# Patient Record
Sex: MALE | Race: WHITE | NOT HISPANIC OR LATINO | URBAN - METROPOLITAN AREA
[De-identification: names, ages, dates, MRNs, and addresses within clinical notes are randomized per-mention and may not be internally consistent; named-entity substitution may affect disease eponyms.]

---

## 2017-09-08 ENCOUNTER — EMERGENCY (EMERGENCY)
Facility: HOSPITAL | Age: 32
LOS: 1 days | Discharge: HOME | End: 2017-09-08
Attending: EMERGENCY MEDICINE | Admitting: EMERGENCY MEDICINE
Payer: COMMERCIAL

## 2017-09-08 VITALS
DIASTOLIC BLOOD PRESSURE: 72 MMHG | TEMPERATURE: 98 F | OXYGEN SATURATION: 98 % | RESPIRATION RATE: 18 BRPM | HEART RATE: 62 BPM | SYSTOLIC BLOOD PRESSURE: 122 MMHG

## 2017-09-08 VITALS — WEIGHT: 220.02 LBS | HEIGHT: 68 IN

## 2017-09-08 DIAGNOSIS — Z98.89 OTHER SPECIFIED POSTPROCEDURAL STATES: Chronic | ICD-10-CM

## 2017-09-08 LAB
ALBUMIN SERPL ELPH-MCNC: 4.7 G/DL — SIGNIFICANT CHANGE UP (ref 3.3–5)
ALP SERPL-CCNC: 72 U/L — SIGNIFICANT CHANGE UP (ref 40–120)
ALT FLD-CCNC: 33 U/L RC — SIGNIFICANT CHANGE UP (ref 10–45)
ANION GAP SERPL CALC-SCNC: 11 MMOL/L — SIGNIFICANT CHANGE UP (ref 5–17)
APPEARANCE UR: CLEAR — SIGNIFICANT CHANGE UP
APTT BLD: 32.1 SEC — SIGNIFICANT CHANGE UP (ref 27.5–37.4)
AST SERPL-CCNC: 22 U/L — SIGNIFICANT CHANGE UP (ref 10–40)
BASE EXCESS BLDV CALC-SCNC: -0.1 MMOL/L — SIGNIFICANT CHANGE UP (ref -2–2)
BASE EXCESS BLDV CALC-SCNC: 0.6 MMOL/L — SIGNIFICANT CHANGE UP (ref -2–2)
BASOPHILS # BLD AUTO: 0.1 K/UL — SIGNIFICANT CHANGE UP (ref 0–0.2)
BASOPHILS NFR BLD AUTO: 0.7 % — SIGNIFICANT CHANGE UP (ref 0–2)
BILIRUB SERPL-MCNC: 0.4 MG/DL — SIGNIFICANT CHANGE UP (ref 0.2–1.2)
BILIRUB UR-MCNC: NEGATIVE — SIGNIFICANT CHANGE UP
BUN SERPL-MCNC: 15 MG/DL — SIGNIFICANT CHANGE UP (ref 7–23)
CA-I SERPL-SCNC: 1.21 MMOL/L — SIGNIFICANT CHANGE UP (ref 1.12–1.3)
CA-I SERPL-SCNC: 1.23 MMOL/L — SIGNIFICANT CHANGE UP (ref 1.12–1.3)
CALCIUM SERPL-MCNC: 9.6 MG/DL — SIGNIFICANT CHANGE UP (ref 8.4–10.5)
CHLORIDE BLDV-SCNC: 106 MMOL/L — SIGNIFICANT CHANGE UP (ref 96–108)
CHLORIDE BLDV-SCNC: 108 MMOL/L — SIGNIFICANT CHANGE UP (ref 96–108)
CHLORIDE SERPL-SCNC: 102 MMOL/L — SIGNIFICANT CHANGE UP (ref 96–108)
CO2 BLDV-SCNC: 25 MMOL/L — SIGNIFICANT CHANGE UP (ref 22–30)
CO2 BLDV-SCNC: 28 MMOL/L — SIGNIFICANT CHANGE UP (ref 22–30)
CO2 SERPL-SCNC: 26 MMOL/L — SIGNIFICANT CHANGE UP (ref 22–31)
COLOR SPEC: SIGNIFICANT CHANGE UP
CREAT SERPL-MCNC: 0.77 MG/DL — SIGNIFICANT CHANGE UP (ref 0.5–1.3)
DIFF PNL FLD: NEGATIVE — SIGNIFICANT CHANGE UP
EOSINOPHIL # BLD AUTO: 0.2 K/UL — SIGNIFICANT CHANGE UP (ref 0–0.5)
EOSINOPHIL NFR BLD AUTO: 2 % — SIGNIFICANT CHANGE UP (ref 0–6)
EPI CELLS # UR: SIGNIFICANT CHANGE UP /HPF
GAS PNL BLDV: 135 MMOL/L — LOW (ref 136–145)
GAS PNL BLDV: 138 MMOL/L — SIGNIFICANT CHANGE UP (ref 136–145)
GAS PNL BLDV: SIGNIFICANT CHANGE UP
GLUCOSE BLDV-MCNC: 78 MG/DL — SIGNIFICANT CHANGE UP (ref 70–99)
GLUCOSE BLDV-MCNC: 79 MG/DL — SIGNIFICANT CHANGE UP (ref 70–99)
GLUCOSE SERPL-MCNC: 79 MG/DL — SIGNIFICANT CHANGE UP (ref 70–99)
GLUCOSE UR QL: NEGATIVE — SIGNIFICANT CHANGE UP
HCO3 BLDV-SCNC: 24 MMOL/L — SIGNIFICANT CHANGE UP (ref 21–29)
HCO3 BLDV-SCNC: 27 MMOL/L — SIGNIFICANT CHANGE UP (ref 21–29)
HCT VFR BLD CALC: 46.1 % — SIGNIFICANT CHANGE UP (ref 39–50)
HCT VFR BLDA CALC: 46 % — SIGNIFICANT CHANGE UP (ref 39–50)
HCT VFR BLDA CALC: 47 % — SIGNIFICANT CHANGE UP (ref 39–50)
HGB BLD CALC-MCNC: 15 G/DL — SIGNIFICANT CHANGE UP (ref 13–17)
HGB BLD CALC-MCNC: 15.4 G/DL — SIGNIFICANT CHANGE UP (ref 13–17)
HGB BLD-MCNC: 15.5 G/DL — SIGNIFICANT CHANGE UP (ref 13–17)
INR BLD: 0.98 RATIO — SIGNIFICANT CHANGE UP (ref 0.88–1.16)
KETONES UR-MCNC: NEGATIVE — SIGNIFICANT CHANGE UP
LACTATE BLDV-MCNC: 1 MMOL/L — SIGNIFICANT CHANGE UP (ref 0.7–2)
LACTATE BLDV-MCNC: 2.3 MMOL/L — HIGH (ref 0.7–2)
LEUKOCYTE ESTERASE UR-ACNC: NEGATIVE — SIGNIFICANT CHANGE UP
LIDOCAIN IGE QN: 31 U/L — SIGNIFICANT CHANGE UP (ref 7–60)
LYMPHOCYTES # BLD AUTO: 2.4 K/UL — SIGNIFICANT CHANGE UP (ref 1–3.3)
LYMPHOCYTES # BLD AUTO: 22.8 % — SIGNIFICANT CHANGE UP (ref 13–44)
MCHC RBC-ENTMCNC: 30.6 PG — SIGNIFICANT CHANGE UP (ref 27–34)
MCHC RBC-ENTMCNC: 33.5 GM/DL — SIGNIFICANT CHANGE UP (ref 32–36)
MCV RBC AUTO: 91.1 FL — SIGNIFICANT CHANGE UP (ref 80–100)
MONOCYTES # BLD AUTO: 0.7 K/UL — SIGNIFICANT CHANGE UP (ref 0–0.9)
MONOCYTES NFR BLD AUTO: 7 % — SIGNIFICANT CHANGE UP (ref 2–14)
NEUTROPHILS # BLD AUTO: 7.2 K/UL — SIGNIFICANT CHANGE UP (ref 1.8–7.4)
NEUTROPHILS NFR BLD AUTO: 67.5 % — SIGNIFICANT CHANGE UP (ref 43–77)
NITRITE UR-MCNC: NEGATIVE — SIGNIFICANT CHANGE UP
PCO2 BLDV: 40 MMHG — SIGNIFICANT CHANGE UP (ref 35–50)
PCO2 BLDV: 50 MMHG — SIGNIFICANT CHANGE UP (ref 35–50)
PH BLDV: 7.35 — SIGNIFICANT CHANGE UP (ref 7.35–7.45)
PH BLDV: 7.4 — SIGNIFICANT CHANGE UP (ref 7.35–7.45)
PH UR: 6 — SIGNIFICANT CHANGE UP (ref 5–8)
PLATELET # BLD AUTO: 222 K/UL — SIGNIFICANT CHANGE UP (ref 150–400)
PO2 BLDV: 37 MMHG — SIGNIFICANT CHANGE UP (ref 25–45)
PO2 BLDV: 53 MMHG — HIGH (ref 25–45)
POTASSIUM BLDV-SCNC: 3.5 MMOL/L — SIGNIFICANT CHANGE UP (ref 3.5–5)
POTASSIUM BLDV-SCNC: 4.8 MMOL/L — SIGNIFICANT CHANGE UP (ref 3.5–5)
POTASSIUM SERPL-MCNC: 4.8 MMOL/L — SIGNIFICANT CHANGE UP (ref 3.5–5.3)
POTASSIUM SERPL-SCNC: 4.8 MMOL/L — SIGNIFICANT CHANGE UP (ref 3.5–5.3)
PROT SERPL-MCNC: 7.4 G/DL — SIGNIFICANT CHANGE UP (ref 6–8.3)
PROT UR-MCNC: NEGATIVE — SIGNIFICANT CHANGE UP
PROTHROM AB SERPL-ACNC: 10.7 SEC — SIGNIFICANT CHANGE UP (ref 9.8–12.7)
RBC # BLD: 5.06 M/UL — SIGNIFICANT CHANGE UP (ref 4.2–5.8)
RBC # FLD: 11.9 % — SIGNIFICANT CHANGE UP (ref 10.3–14.5)
SAO2 % BLDV: 68 % — SIGNIFICANT CHANGE UP (ref 67–88)
SAO2 % BLDV: 88 % — SIGNIFICANT CHANGE UP (ref 67–88)
SODIUM SERPL-SCNC: 139 MMOL/L — SIGNIFICANT CHANGE UP (ref 135–145)
SP GR SPEC: 1.02 — SIGNIFICANT CHANGE UP (ref 1.01–1.02)
UROBILINOGEN FLD QL: NEGATIVE — SIGNIFICANT CHANGE UP
WBC # BLD: 10.6 K/UL — HIGH (ref 3.8–10.5)
WBC # FLD AUTO: 10.6 K/UL — HIGH (ref 3.8–10.5)
WBC UR QL: SIGNIFICANT CHANGE UP /HPF (ref 0–5)

## 2017-09-08 PROCEDURE — 96374 THER/PROPH/DIAG INJ IV PUSH: CPT | Mod: XU

## 2017-09-08 PROCEDURE — 85730 THROMBOPLASTIN TIME PARTIAL: CPT

## 2017-09-08 PROCEDURE — 93010 ELECTROCARDIOGRAM REPORT: CPT

## 2017-09-08 PROCEDURE — 74177 CT ABD & PELVIS W/CONTRAST: CPT

## 2017-09-08 PROCEDURE — 83690 ASSAY OF LIPASE: CPT

## 2017-09-08 PROCEDURE — 99285 EMERGENCY DEPT VISIT HI MDM: CPT | Mod: 25

## 2017-09-08 PROCEDURE — 83605 ASSAY OF LACTIC ACID: CPT

## 2017-09-08 PROCEDURE — 85027 COMPLETE CBC AUTOMATED: CPT

## 2017-09-08 PROCEDURE — 84132 ASSAY OF SERUM POTASSIUM: CPT

## 2017-09-08 PROCEDURE — 82947 ASSAY GLUCOSE BLOOD QUANT: CPT

## 2017-09-08 PROCEDURE — 80053 COMPREHEN METABOLIC PANEL: CPT

## 2017-09-08 PROCEDURE — 99284 EMERGENCY DEPT VISIT MOD MDM: CPT | Mod: 25

## 2017-09-08 PROCEDURE — 81001 URINALYSIS AUTO W/SCOPE: CPT

## 2017-09-08 PROCEDURE — 87086 URINE CULTURE/COLONY COUNT: CPT

## 2017-09-08 PROCEDURE — 93005 ELECTROCARDIOGRAM TRACING: CPT

## 2017-09-08 PROCEDURE — 96375 TX/PRO/DX INJ NEW DRUG ADDON: CPT

## 2017-09-08 PROCEDURE — 84295 ASSAY OF SERUM SODIUM: CPT

## 2017-09-08 PROCEDURE — 82330 ASSAY OF CALCIUM: CPT

## 2017-09-08 PROCEDURE — 82435 ASSAY OF BLOOD CHLORIDE: CPT

## 2017-09-08 PROCEDURE — 85014 HEMATOCRIT: CPT

## 2017-09-08 PROCEDURE — 82803 BLOOD GASES ANY COMBINATION: CPT

## 2017-09-08 PROCEDURE — 85610 PROTHROMBIN TIME: CPT

## 2017-09-08 PROCEDURE — 74177 CT ABD & PELVIS W/CONTRAST: CPT | Mod: 26

## 2017-09-08 RX ORDER — OXYCODONE HYDROCHLORIDE 5 MG/1
1 TABLET ORAL
Qty: 6 | Refills: 0 | OUTPATIENT
Start: 2017-09-08 | End: 2017-09-10

## 2017-09-08 RX ORDER — MORPHINE SULFATE 50 MG/1
2 CAPSULE, EXTENDED RELEASE ORAL ONCE
Qty: 0 | Refills: 0 | Status: DISCONTINUED | OUTPATIENT
Start: 2017-09-08 | End: 2017-09-08

## 2017-09-08 RX ORDER — ONDANSETRON 8 MG/1
1 TABLET, FILM COATED ORAL
Qty: 9 | Refills: 0 | OUTPATIENT
Start: 2017-09-08 | End: 2017-09-11

## 2017-09-08 RX ORDER — SODIUM CHLORIDE 9 MG/ML
1000 INJECTION INTRAMUSCULAR; INTRAVENOUS; SUBCUTANEOUS ONCE
Qty: 0 | Refills: 0 | Status: COMPLETED | OUTPATIENT
Start: 2017-09-08 | End: 2017-09-08

## 2017-09-08 RX ORDER — ONDANSETRON 8 MG/1
4 TABLET, FILM COATED ORAL ONCE
Qty: 0 | Refills: 0 | Status: COMPLETED | OUTPATIENT
Start: 2017-09-08 | End: 2017-09-08

## 2017-09-08 RX ORDER — ACETAMINOPHEN 500 MG
1000 TABLET ORAL ONCE
Qty: 0 | Refills: 0 | Status: COMPLETED | OUTPATIENT
Start: 2017-09-08 | End: 2017-09-08

## 2017-09-08 RX ADMIN — ONDANSETRON 4 MILLIGRAM(S): 8 TABLET, FILM COATED ORAL at 15:21

## 2017-09-08 RX ADMIN — SODIUM CHLORIDE 1000 MILLILITER(S): 9 INJECTION INTRAMUSCULAR; INTRAVENOUS; SUBCUTANEOUS at 15:54

## 2017-09-08 RX ADMIN — MORPHINE SULFATE 2 MILLIGRAM(S): 50 CAPSULE, EXTENDED RELEASE ORAL at 15:22

## 2017-09-08 RX ADMIN — Medication 1000 MILLIGRAM(S): at 11:50

## 2017-09-08 RX ADMIN — Medication 400 MILLIGRAM(S): at 11:37

## 2017-09-08 RX ADMIN — MORPHINE SULFATE 2 MILLIGRAM(S): 50 CAPSULE, EXTENDED RELEASE ORAL at 15:54

## 2017-09-08 NOTE — ED PROVIDER NOTE - ATTENDING CONTRIBUTION TO CARE
Gonzalez:  I have independently evaluated the patient and have documented in the appropriate sections above.  I agree with the exam and plan as noted above.

## 2017-09-08 NOTE — ED PROVIDER NOTE - PLAN OF CARE
1) Take oxycodone as prescribed. Do not drive or operate heavy machinary while taking.   2) Take zofran as prescribed   3) Take Tylenol 325mg tablet, take 2 tablets every 6-8 hours as needed for pain   4) You were given a copy of your results, please show them to your doctor for review.   5) Follow up with your GI doctor in 1-2 days for reevaluation, call clinic at [phone] for an appointment   6) Return to the ED for worsening pain, nausea, vomiting, diarrhea, fever greater than 100.4, chest pain, shortness of breath, or if you have any other new, worsening, or concerning symptoms.

## 2017-09-08 NOTE — ED PROVIDER NOTE - PHYSICAL EXAMINATION
Roth:  General: No distress.  Mentation at baseline.   HEENT: WNL  Chest/Lungs: CTAB, No wheeze, No retractions, No increased work of breathing, Normal rate  Heart: S1S2 RRR, No M/R/G, Pules equal Bilaterally in upper and lower extremities distally  Abd: soft, tender to left side no flank tenderness, No guarding, No rebound.  No hernias, no palpable masses.  Extrem: FROM in all joints, no significant edema noted, No ulcers.  Cap refil < 2sec.  Skin: No rash noted, warm dry.  Neuro:  Grossly normal.  No difficulty ambulating. No focal deficits.  Psychiatric: No evidence of delusions. No SI/HI.

## 2017-09-08 NOTE — ED PROVIDER NOTE - CARE PLAN
Principal Discharge DX:	Abdominal pain  Instructions for follow-up, activity and diet:	1) Take oxycodone as prescribed. Do not drive or operate heavy machinary while taking.   2) Take zofran as prescribed   3) Take Tylenol 325mg tablet, take 2 tablets every 6-8 hours as needed for pain   4) You were given a copy of your results, please show them to your doctor for review.   5) Follow up with your GI doctor in 1-2 days for reevaluation, call clinic at [phone] for an appointment   6) Return to the ED for worsening pain, nausea, vomiting, diarrhea, fever greater than 100.4, chest pain, shortness of breath, or if you have any other new, worsening, or concerning symptoms.

## 2017-09-08 NOTE — ED ADULT NURSE NOTE - OBJECTIVE STATEMENT
32 YO male presents to ED via walk-in with c/o abdominal pain to Left side, non radiating, pain 8/10, and nausea since last night. A&Ox4, gross neuro intact, PERRLA. Lungs clear upon auscultation bilat. S1/S2 noted. Abdomen soft, nontender, not distended, +bs. + peripheral pulses. MD at bedside to assess. Comfort and safety maintained.

## 2017-09-08 NOTE — ED ADULT TRIAGE NOTE - CHIEF COMPLAINT QUOTE
abd pain in LUQ, pt has known hernia. pt states abd pain has been getting worse since yesterday, hernia is in LLQ.

## 2017-09-08 NOTE — ED PROVIDER NOTE - MEDICAL DECISION MAKING DETAILS
31 year old male no past medical history presents to the ED for abdominal.  left upper quadrant pain worsening over past 2 weeks. Eval for colitis. Differential also includes pancreatitis, gastroesophageal reflux disease, ulcer and reeval.

## 2017-09-08 NOTE — ED PROVIDER NOTE - OBJECTIVE STATEMENT
31 year old male no past medical history presents to the ED for abdominal. Reports abdominal pain for 2 weeks, worsening over past 2 days, stabbing, intermittent, relieved with laying on his side, no aggravating factors, took advil without relief.  Reports nausea without vomiting. No fevers but reports chills. No diarrhea but reports loose stools. No hematochezia or melena. No chest pain, shortness of breath. No sick contacts or recent travel.     PCP: Dr. Wise 31 year old male no past medical history presents to the ED for abdominal. Reports abdominal pain for 2 weeks, worsening over past 2 days, stabbing, intermittent, relieved with laying on his side, no aggravating factors, took advil without relief.  Reports nausea without vomiting. No fevers but reports chills. No diarrhea but reports loose stools. No hematochezia or melena. No chest pain, shortness of breath. No sick contacts or recent travel.     Roth:  Pain to left side of abdomen with chills/nausea.    PCP: Dr. Wise

## 2017-09-08 NOTE — ED PROVIDER NOTE - PROGRESS NOTE DETAILS
pain improved, nausea improved, abdomen ssnt on reexamination, discussed need to follow up with GI, strict return precautions. patient understands and verbalizes back instructions.

## 2017-09-09 LAB
CULTURE RESULTS: SIGNIFICANT CHANGE UP
SPECIMEN SOURCE: SIGNIFICANT CHANGE UP

## 2017-09-11 ENCOUNTER — APPOINTMENT (OUTPATIENT)
Dept: INTERNAL MEDICINE | Facility: CLINIC | Age: 32
End: 2017-09-11
Payer: COMMERCIAL

## 2017-09-11 VITALS
WEIGHT: 221 LBS | SYSTOLIC BLOOD PRESSURE: 130 MMHG | BODY MASS INDEX: 33.11 KG/M2 | HEART RATE: 70 BPM | OXYGEN SATURATION: 97 % | DIASTOLIC BLOOD PRESSURE: 88 MMHG

## 2017-09-11 DIAGNOSIS — K40.90 UNILATERAL INGUINAL HERNIA, W/OUT OBSTRUCTION OR GANGRENE, NOT SPECIFIED AS RECURRENT: ICD-10-CM

## 2017-09-11 DIAGNOSIS — R10.9 UNSPECIFIED ABDOMINAL PAIN: ICD-10-CM

## 2017-09-11 PROCEDURE — 99214 OFFICE O/P EST MOD 30 MIN: CPT

## 2017-09-12 ENCOUNTER — APPOINTMENT (OUTPATIENT)
Dept: INTERNAL MEDICINE | Facility: CLINIC | Age: 32
End: 2017-09-12

## 2017-09-25 ENCOUNTER — APPOINTMENT (OUTPATIENT)
Dept: SURGERY | Facility: CLINIC | Age: 32
End: 2017-09-25

## 2018-03-01 ENCOUNTER — EMERGENCY (EMERGENCY)
Facility: HOSPITAL | Age: 33
LOS: 1 days | Discharge: HOME | End: 2018-03-01
Attending: EMERGENCY MEDICINE
Payer: COMMERCIAL

## 2018-03-01 VITALS
RESPIRATION RATE: 16 BRPM | HEIGHT: 68 IN | WEIGHT: 220.02 LBS | OXYGEN SATURATION: 97 % | SYSTOLIC BLOOD PRESSURE: 131 MMHG | TEMPERATURE: 98 F | HEART RATE: 85 BPM | DIASTOLIC BLOOD PRESSURE: 81 MMHG

## 2018-03-01 VITALS
RESPIRATION RATE: 17 BRPM | OXYGEN SATURATION: 99 % | HEART RATE: 69 BPM | SYSTOLIC BLOOD PRESSURE: 127 MMHG | DIASTOLIC BLOOD PRESSURE: 68 MMHG

## 2018-03-01 DIAGNOSIS — Z98.89 OTHER SPECIFIED POSTPROCEDURAL STATES: Chronic | ICD-10-CM

## 2018-03-01 LAB
ALBUMIN SERPL ELPH-MCNC: 4.4 G/DL — SIGNIFICANT CHANGE UP (ref 3.3–5)
ALP SERPL-CCNC: 63 U/L — SIGNIFICANT CHANGE UP (ref 40–120)
ALT FLD-CCNC: 32 U/L RC — SIGNIFICANT CHANGE UP (ref 10–45)
ANION GAP SERPL CALC-SCNC: 14 MMOL/L — SIGNIFICANT CHANGE UP (ref 5–17)
AST SERPL-CCNC: 23 U/L — SIGNIFICANT CHANGE UP (ref 10–40)
BASE EXCESS BLDV CALC-SCNC: 0.7 MMOL/L — SIGNIFICANT CHANGE UP (ref -2–2)
BASOPHILS # BLD AUTO: 0 K/UL — SIGNIFICANT CHANGE UP (ref 0–0.2)
BASOPHILS NFR BLD AUTO: 0.4 % — SIGNIFICANT CHANGE UP (ref 0–2)
BILIRUB SERPL-MCNC: 0.5 MG/DL — SIGNIFICANT CHANGE UP (ref 0.2–1.2)
BUN SERPL-MCNC: 8 MG/DL — SIGNIFICANT CHANGE UP (ref 7–23)
CA-I SERPL-SCNC: 1.21 MMOL/L — SIGNIFICANT CHANGE UP (ref 1.12–1.3)
CALCIUM SERPL-MCNC: 9.1 MG/DL — SIGNIFICANT CHANGE UP (ref 8.4–10.5)
CHLORIDE BLDV-SCNC: 105 MMOL/L — SIGNIFICANT CHANGE UP (ref 96–108)
CHLORIDE SERPL-SCNC: 102 MMOL/L — SIGNIFICANT CHANGE UP (ref 96–108)
CO2 BLDV-SCNC: 28 MMOL/L — SIGNIFICANT CHANGE UP (ref 22–30)
CO2 SERPL-SCNC: 23 MMOL/L — SIGNIFICANT CHANGE UP (ref 22–31)
CREAT SERPL-MCNC: 0.74 MG/DL — SIGNIFICANT CHANGE UP (ref 0.5–1.3)
EOSINOPHIL # BLD AUTO: 0.1 K/UL — SIGNIFICANT CHANGE UP (ref 0–0.5)
EOSINOPHIL NFR BLD AUTO: 1.1 % — SIGNIFICANT CHANGE UP (ref 0–6)
GAS PNL BLDV: 136 MMOL/L — SIGNIFICANT CHANGE UP (ref 136–145)
GAS PNL BLDV: SIGNIFICANT CHANGE UP
GAS PNL BLDV: SIGNIFICANT CHANGE UP
GLUCOSE BLDV-MCNC: 90 MG/DL — SIGNIFICANT CHANGE UP (ref 70–99)
GLUCOSE SERPL-MCNC: 90 MG/DL — SIGNIFICANT CHANGE UP (ref 70–99)
HCO3 BLDV-SCNC: 26 MMOL/L — SIGNIFICANT CHANGE UP (ref 21–29)
HCT VFR BLD CALC: 46.1 % — SIGNIFICANT CHANGE UP (ref 39–50)
HCT VFR BLDA CALC: 50 % — SIGNIFICANT CHANGE UP (ref 39–50)
HGB BLD CALC-MCNC: 16.4 G/DL — SIGNIFICANT CHANGE UP (ref 13–17)
HGB BLD-MCNC: 15.9 G/DL — SIGNIFICANT CHANGE UP (ref 13–17)
LACTATE BLDV-MCNC: 0.9 MMOL/L — SIGNIFICANT CHANGE UP (ref 0.7–2)
LIDOCAIN IGE QN: 19 U/L — SIGNIFICANT CHANGE UP (ref 7–60)
LYMPHOCYTES # BLD AUTO: 0.9 K/UL — LOW (ref 1–3.3)
LYMPHOCYTES # BLD AUTO: 12.1 % — LOW (ref 13–44)
MCHC RBC-ENTMCNC: 30.7 PG — SIGNIFICANT CHANGE UP (ref 27–34)
MCHC RBC-ENTMCNC: 34.5 GM/DL — SIGNIFICANT CHANGE UP (ref 32–36)
MCV RBC AUTO: 89 FL — SIGNIFICANT CHANGE UP (ref 80–100)
MONOCYTES # BLD AUTO: 0.8 K/UL — SIGNIFICANT CHANGE UP (ref 0–0.9)
MONOCYTES NFR BLD AUTO: 10.6 % — SIGNIFICANT CHANGE UP (ref 2–14)
NEUTROPHILS # BLD AUTO: 5.6 K/UL — SIGNIFICANT CHANGE UP (ref 1.8–7.4)
NEUTROPHILS NFR BLD AUTO: 75.8 % — SIGNIFICANT CHANGE UP (ref 43–77)
PCO2 BLDV: 48 MMHG — SIGNIFICANT CHANGE UP (ref 35–50)
PH BLDV: 7.36 — SIGNIFICANT CHANGE UP (ref 7.35–7.45)
PLATELET # BLD AUTO: 198 K/UL — SIGNIFICANT CHANGE UP (ref 150–400)
PO2 BLDV: 37 MMHG — SIGNIFICANT CHANGE UP (ref 25–45)
POTASSIUM BLDV-SCNC: 3.7 MMOL/L — SIGNIFICANT CHANGE UP (ref 3.5–5)
POTASSIUM SERPL-MCNC: 4 MMOL/L — SIGNIFICANT CHANGE UP (ref 3.5–5.3)
POTASSIUM SERPL-SCNC: 4 MMOL/L — SIGNIFICANT CHANGE UP (ref 3.5–5.3)
PROT SERPL-MCNC: 7.8 G/DL — SIGNIFICANT CHANGE UP (ref 6–8.3)
RBC # BLD: 5.17 M/UL — SIGNIFICANT CHANGE UP (ref 4.2–5.8)
RBC # FLD: 11.9 % — SIGNIFICANT CHANGE UP (ref 10.3–14.5)
SAO2 % BLDV: 67 % — SIGNIFICANT CHANGE UP (ref 67–88)
SODIUM SERPL-SCNC: 139 MMOL/L — SIGNIFICANT CHANGE UP (ref 135–145)
WBC # BLD: 7.4 K/UL — SIGNIFICANT CHANGE UP (ref 3.8–10.5)
WBC # FLD AUTO: 7.4 K/UL — SIGNIFICANT CHANGE UP (ref 3.8–10.5)

## 2018-03-01 PROCEDURE — 85014 HEMATOCRIT: CPT

## 2018-03-01 PROCEDURE — 99284 EMERGENCY DEPT VISIT MOD MDM: CPT

## 2018-03-01 PROCEDURE — 82435 ASSAY OF BLOOD CHLORIDE: CPT

## 2018-03-01 PROCEDURE — 84295 ASSAY OF SERUM SODIUM: CPT

## 2018-03-01 PROCEDURE — 82803 BLOOD GASES ANY COMBINATION: CPT

## 2018-03-01 PROCEDURE — 96374 THER/PROPH/DIAG INJ IV PUSH: CPT

## 2018-03-01 PROCEDURE — 85027 COMPLETE CBC AUTOMATED: CPT

## 2018-03-01 PROCEDURE — 82330 ASSAY OF CALCIUM: CPT

## 2018-03-01 PROCEDURE — 80053 COMPREHEN METABOLIC PANEL: CPT

## 2018-03-01 PROCEDURE — 82947 ASSAY GLUCOSE BLOOD QUANT: CPT

## 2018-03-01 PROCEDURE — 99284 EMERGENCY DEPT VISIT MOD MDM: CPT | Mod: 25

## 2018-03-01 PROCEDURE — 83690 ASSAY OF LIPASE: CPT

## 2018-03-01 PROCEDURE — 83605 ASSAY OF LACTIC ACID: CPT

## 2018-03-01 PROCEDURE — 96375 TX/PRO/DX INJ NEW DRUG ADDON: CPT

## 2018-03-01 PROCEDURE — 84132 ASSAY OF SERUM POTASSIUM: CPT

## 2018-03-01 RX ORDER — ONDANSETRON 8 MG/1
4 TABLET, FILM COATED ORAL ONCE
Qty: 0 | Refills: 0 | Status: COMPLETED | OUTPATIENT
Start: 2018-03-01 | End: 2018-03-01

## 2018-03-01 RX ORDER — ONDANSETRON 8 MG/1
1 TABLET, FILM COATED ORAL
Qty: 12 | Refills: 0 | OUTPATIENT
Start: 2018-03-01 | End: 2018-03-04

## 2018-03-01 RX ORDER — LIDOCAINE 4 G/100G
10 CREAM TOPICAL ONCE
Qty: 0 | Refills: 0 | Status: COMPLETED | OUTPATIENT
Start: 2018-03-01 | End: 2018-03-01

## 2018-03-01 RX ORDER — KETOROLAC TROMETHAMINE 30 MG/ML
15 SYRINGE (ML) INJECTION ONCE
Qty: 0 | Refills: 0 | Status: DISCONTINUED | OUTPATIENT
Start: 2018-03-01 | End: 2018-03-01

## 2018-03-01 RX ORDER — SODIUM CHLORIDE 9 MG/ML
1000 INJECTION INTRAMUSCULAR; INTRAVENOUS; SUBCUTANEOUS ONCE
Qty: 0 | Refills: 0 | Status: COMPLETED | OUTPATIENT
Start: 2018-03-01 | End: 2018-03-01

## 2018-03-01 RX ORDER — FAMOTIDINE 10 MG/ML
20 INJECTION INTRAVENOUS ONCE
Qty: 0 | Refills: 0 | Status: COMPLETED | OUTPATIENT
Start: 2018-03-01 | End: 2018-03-01

## 2018-03-01 RX ADMIN — Medication 30 MILLILITER(S): at 11:19

## 2018-03-01 RX ADMIN — Medication 15 MILLIGRAM(S): at 10:48

## 2018-03-01 RX ADMIN — FAMOTIDINE 20 MILLIGRAM(S): 10 INJECTION INTRAVENOUS at 11:19

## 2018-03-01 RX ADMIN — SODIUM CHLORIDE 4000 MILLILITER(S): 9 INJECTION INTRAMUSCULAR; INTRAVENOUS; SUBCUTANEOUS at 10:24

## 2018-03-01 RX ADMIN — ONDANSETRON 4 MILLIGRAM(S): 8 TABLET, FILM COATED ORAL at 10:24

## 2018-03-01 RX ADMIN — LIDOCAINE 10 MILLILITER(S): 4 CREAM TOPICAL at 11:19

## 2018-03-01 RX ADMIN — Medication 15 MILLIGRAM(S): at 10:24

## 2018-03-01 NOTE — ED ADULT NURSE REASSESSMENT NOTE - NS ED NURSE REASSESS COMMENT FT1
patient is resting in the romero. patient was able to tolerate po intake. denies any complaints. vss/nad.

## 2018-03-01 NOTE — ED PROVIDER NOTE - MEDICAL DECISION MAKING DETAILS
31yo M, nontoxic appearing, no signficant PMH, no risk factors for Cdiff, diarrhea does not sound as bacterial/invasive. does have h/o colitis but abd exam with only mild left upper quadrant pain and pain in past much more severe. likely viral gastroenteritis and possible food poisoning. will check labs/electrolytes. hydrate. pain control. GI cocktail. had discussion with patient regarding low suspicion for colitis especially without fevers and well appearing, will hold on CT.

## 2018-03-01 NOTE — ED PROVIDER NOTE - OBJECTIVE STATEMENT
33yo M with diarrhea/vomiting started Monday, ate at seafood restaurant Sunday night, no other sick contacts/recent travel or ABx use. vomiting stopped. +nausea. still with diarrhea- watery ~6-8x a day. nonbloody. ~24hrs of left sided abd pain, cramping. h/o colitis 1 yr ago, normal colonoscopy pain worse at that time. no fever. +chills.

## 2018-03-01 NOTE — ED PROVIDER NOTE - PHYSICAL EXAMINATION
Gen: NAD, AOx3  Head: NCAT  HEENT: PERRL, oral mucosa moist, normal conjunctiva, neck supple  Lung: CTAB, no respiratory distress  CV: rrr, no murmur, Normal perfusion  Abd: soft, NT, +ttp left upper quadrant mild no rebound/guarding, not peritoneal  MSK: No edema, no visible deformities  Neuro: No focal neurologic deficits  Skin: No rash   Psych: normal affect

## 2018-03-01 NOTE — ED PROVIDER NOTE - CARE PLAN
Principal Discharge DX:	Nausea and vomiting  Assessment and plan of treatment:	1. Return to ED for worsening, progressive or any other concerning symptoms   2. Follow up with your primary care doctor in 2-3days   3. Take motrin 600mg every 6 hours as needed for pain and Take Tylenol up to 650 mg every 6 hours as needed for pain.   4. Take 20mg of pepcid twice a day   5. You can also take over the counter maalox for upper abd pain and indigestion and imodium for diarrhea   6. Take 4mg Zofran every 6-8 hours as needed for nausea  Secondary Diagnosis:	Diarrhea

## 2018-03-01 NOTE — ED PROVIDER NOTE - ATTENDING CONTRIBUTION TO CARE
Patient is a 33 yo M with hx of IBS here for 5 days of nausea, vomiting, diarrhea and abdominal cramping. Symptoms began after eating out at a seafood restaurant. He states he had nbnb vomiting Monday, Tuesday and wednesday and has diarrhea daily for the past 5 days. Episodes are 6-8 times a day. No fevers. No travel. No family history of Crohn's disease/ Ulcerative colitis. Patient reports episode of colitis last year but pain was more severe at that time. Denies urinary symptoms.   VS noted  Gen. no acute distress, Non toxic   HEENT: EOMI, mmm  Lungs: CTAB/L no C/ W /R   CVS: RRR   Abd; Soft non tender, non distended, no CVA tenderness b/l  Ext: no edema  Skin: no rash  Neuro AAOx3 non focal clear speech  a/p: n/v/d - no hx of abdominal surgeries - exam benign - pt points to his LUQ for area of pain. Pain is non-radiating. Suspect gastroenteritis. Will treat symptomatically, check labs, IV hydrate and reassess.  - Kristina DUNCAN

## 2018-03-01 NOTE — ED PROVIDER NOTE - NS ED ROS FT
ROS: no CP/SOB. no cough. no fever. +n/v/d. +abd pain. no rash. no bleeding. no urinary complaints. no weakness. no vision changes. no HA. no neck/back pain. no extremity swelling/deformity. No change in mental status.

## 2018-03-01 NOTE — ED ADULT NURSE NOTE - OBJECTIVE STATEMENT
32 y m came to the ed c/o ruq abdominal pain. states having pain, nausea, vomiting and diarrhea since monday. states he ate at a seafood buffet and the symptoms began a few hours later. patient is a/ox3. denies any fevers, chest pain, sob. skin is warm and dry. denies any pain/burning on urination.

## 2018-03-01 NOTE — ED PROVIDER NOTE - PLAN OF CARE
1. Return to ED for worsening, progressive or any other concerning symptoms   2. Follow up with your primary care doctor in 2-3days   3. Take motrin 600mg every 6 hours as needed for pain and Take Tylenol up to 650 mg every 6 hours as needed for pain.   4. Take 20mg of pepcid twice a day   5. You can also take over the counter maalox for upper abd pain and indigestion and imodium for diarrhea   6. Take 4mg Zofran every 6-8 hours as needed for nausea

## 2018-03-01 NOTE — ED PROVIDER NOTE - PROGRESS NOTE DETAILS
abd pain improved, tolerating PO, labs unremarkable, will D/C likely viral gastroenteritis -Slowey DO

## 2019-05-19 ENCOUNTER — EMERGENCY (EMERGENCY)
Facility: HOSPITAL | Age: 34
LOS: 1 days | Discharge: HOME | End: 2019-05-19
Attending: EMERGENCY MEDICINE
Payer: COMMERCIAL

## 2019-05-19 VITALS
TEMPERATURE: 98 F | SYSTOLIC BLOOD PRESSURE: 114 MMHG | HEIGHT: 68 IN | RESPIRATION RATE: 18 BRPM | HEART RATE: 95 BPM | OXYGEN SATURATION: 97 % | DIASTOLIC BLOOD PRESSURE: 74 MMHG | WEIGHT: 220.02 LBS

## 2019-05-19 VITALS
OXYGEN SATURATION: 99 % | TEMPERATURE: 98 F | SYSTOLIC BLOOD PRESSURE: 125 MMHG | HEART RATE: 84 BPM | DIASTOLIC BLOOD PRESSURE: 79 MMHG | RESPIRATION RATE: 18 BRPM

## 2019-05-19 DIAGNOSIS — Z98.89 OTHER SPECIFIED POSTPROCEDURAL STATES: Chronic | ICD-10-CM

## 2019-05-19 DIAGNOSIS — Z98.890 OTHER SPECIFIED POSTPROCEDURAL STATES: Chronic | ICD-10-CM

## 2019-05-19 LAB
ALBUMIN SERPL ELPH-MCNC: 4.6 G/DL — SIGNIFICANT CHANGE UP (ref 3.3–5)
ALP SERPL-CCNC: 67 U/L — SIGNIFICANT CHANGE UP (ref 40–120)
ALT FLD-CCNC: 36 U/L — SIGNIFICANT CHANGE UP (ref 10–45)
ANION GAP SERPL CALC-SCNC: 12 MMOL/L — SIGNIFICANT CHANGE UP (ref 5–17)
AST SERPL-CCNC: 24 U/L — SIGNIFICANT CHANGE UP (ref 10–40)
BASE EXCESS BLDV CALC-SCNC: 3.3 MMOL/L — HIGH (ref -2–2)
BASOPHILS # BLD AUTO: 0.1 K/UL — SIGNIFICANT CHANGE UP (ref 0–0.2)
BASOPHILS NFR BLD AUTO: 0.4 % — SIGNIFICANT CHANGE UP (ref 0–2)
BILIRUB SERPL-MCNC: 0.7 MG/DL — SIGNIFICANT CHANGE UP (ref 0.2–1.2)
BUN SERPL-MCNC: 7 MG/DL — SIGNIFICANT CHANGE UP (ref 7–23)
CA-I SERPL-SCNC: 1.22 MMOL/L — SIGNIFICANT CHANGE UP (ref 1.12–1.3)
CALCIUM SERPL-MCNC: 9.7 MG/DL — SIGNIFICANT CHANGE UP (ref 8.4–10.5)
CHLORIDE BLDV-SCNC: 104 MMOL/L — SIGNIFICANT CHANGE UP (ref 96–108)
CHLORIDE SERPL-SCNC: 101 MMOL/L — SIGNIFICANT CHANGE UP (ref 96–108)
CO2 BLDV-SCNC: 30 MMOL/L — SIGNIFICANT CHANGE UP (ref 22–30)
CO2 SERPL-SCNC: 26 MMOL/L — SIGNIFICANT CHANGE UP (ref 22–31)
CREAT SERPL-MCNC: 0.65 MG/DL — SIGNIFICANT CHANGE UP (ref 0.5–1.3)
EOSINOPHIL # BLD AUTO: 0.2 K/UL — SIGNIFICANT CHANGE UP (ref 0–0.5)
EOSINOPHIL NFR BLD AUTO: 1.2 % — SIGNIFICANT CHANGE UP (ref 0–6)
GAS PNL BLDV: 138 MMOL/L — SIGNIFICANT CHANGE UP (ref 136–145)
GAS PNL BLDV: SIGNIFICANT CHANGE UP
GAS PNL BLDV: SIGNIFICANT CHANGE UP
GLUCOSE BLDV-MCNC: 80 MG/DL — SIGNIFICANT CHANGE UP (ref 70–99)
GLUCOSE SERPL-MCNC: 84 MG/DL — SIGNIFICANT CHANGE UP (ref 70–99)
HCO3 BLDV-SCNC: 28 MMOL/L — SIGNIFICANT CHANGE UP (ref 21–29)
HCT VFR BLD CALC: 45.1 % — SIGNIFICANT CHANGE UP (ref 39–50)
HCT VFR BLDA CALC: 46 % — SIGNIFICANT CHANGE UP (ref 39–50)
HGB BLD CALC-MCNC: 15.1 G/DL — SIGNIFICANT CHANGE UP (ref 13–17)
HGB BLD-MCNC: 14.4 G/DL — SIGNIFICANT CHANGE UP (ref 13–17)
LACTATE BLDV-MCNC: 1.1 MMOL/L — SIGNIFICANT CHANGE UP (ref 0.7–2)
LYMPHOCYTES # BLD AUTO: 11.8 % — LOW (ref 13–44)
LYMPHOCYTES # BLD AUTO: 2 K/UL — SIGNIFICANT CHANGE UP (ref 1–3.3)
MCHC RBC-ENTMCNC: 28.2 PG — SIGNIFICANT CHANGE UP (ref 27–34)
MCHC RBC-ENTMCNC: 31.9 GM/DL — LOW (ref 32–36)
MCV RBC AUTO: 88.5 FL — SIGNIFICANT CHANGE UP (ref 80–100)
MONOCYTES # BLD AUTO: 1.2 K/UL — HIGH (ref 0–0.9)
MONOCYTES NFR BLD AUTO: 7.4 % — SIGNIFICANT CHANGE UP (ref 2–14)
NEUTROPHILS # BLD AUTO: 13 K/UL — HIGH (ref 1.8–7.4)
NEUTROPHILS NFR BLD AUTO: 79.1 % — HIGH (ref 43–77)
PCO2 BLDV: 47 MMHG — SIGNIFICANT CHANGE UP (ref 35–50)
PH BLDV: 7.4 — SIGNIFICANT CHANGE UP (ref 7.35–7.45)
PLATELET # BLD AUTO: 215 K/UL — SIGNIFICANT CHANGE UP (ref 150–400)
PO2 BLDV: 31 MMHG — SIGNIFICANT CHANGE UP (ref 25–45)
POTASSIUM BLDV-SCNC: 3.5 MMOL/L — SIGNIFICANT CHANGE UP (ref 3.5–5.3)
POTASSIUM SERPL-MCNC: 3.6 MMOL/L — SIGNIFICANT CHANGE UP (ref 3.5–5.3)
POTASSIUM SERPL-SCNC: 3.6 MMOL/L — SIGNIFICANT CHANGE UP (ref 3.5–5.3)
PROT SERPL-MCNC: 7.3 G/DL — SIGNIFICANT CHANGE UP (ref 6–8.3)
RBC # BLD: 5.1 M/UL — SIGNIFICANT CHANGE UP (ref 4.2–5.8)
RBC # FLD: 11.7 % — SIGNIFICANT CHANGE UP (ref 10.3–14.5)
SAO2 % BLDV: 57 % — LOW (ref 67–88)
SODIUM SERPL-SCNC: 139 MMOL/L — SIGNIFICANT CHANGE UP (ref 135–145)
WBC # BLD: 16.5 K/UL — HIGH (ref 3.8–10.5)
WBC # FLD AUTO: 16.5 K/UL — HIGH (ref 3.8–10.5)

## 2019-05-19 PROCEDURE — 82330 ASSAY OF CALCIUM: CPT

## 2019-05-19 PROCEDURE — 82435 ASSAY OF BLOOD CHLORIDE: CPT

## 2019-05-19 PROCEDURE — 84295 ASSAY OF SERUM SODIUM: CPT

## 2019-05-19 PROCEDURE — 82803 BLOOD GASES ANY COMBINATION: CPT

## 2019-05-19 PROCEDURE — 99284 EMERGENCY DEPT VISIT MOD MDM: CPT | Mod: 25

## 2019-05-19 PROCEDURE — 96374 THER/PROPH/DIAG INJ IV PUSH: CPT | Mod: XU

## 2019-05-19 PROCEDURE — 71046 X-RAY EXAM CHEST 2 VIEWS: CPT

## 2019-05-19 PROCEDURE — 83605 ASSAY OF LACTIC ACID: CPT

## 2019-05-19 PROCEDURE — 71046 X-RAY EXAM CHEST 2 VIEWS: CPT | Mod: 26

## 2019-05-19 PROCEDURE — 82947 ASSAY GLUCOSE BLOOD QUANT: CPT

## 2019-05-19 PROCEDURE — 99284 EMERGENCY DEPT VISIT MOD MDM: CPT

## 2019-05-19 PROCEDURE — 74177 CT ABD & PELVIS W/CONTRAST: CPT

## 2019-05-19 PROCEDURE — 80053 COMPREHEN METABOLIC PANEL: CPT

## 2019-05-19 PROCEDURE — 84132 ASSAY OF SERUM POTASSIUM: CPT

## 2019-05-19 PROCEDURE — 74177 CT ABD & PELVIS W/CONTRAST: CPT | Mod: 26

## 2019-05-19 PROCEDURE — 85027 COMPLETE CBC AUTOMATED: CPT

## 2019-05-19 PROCEDURE — 85014 HEMATOCRIT: CPT

## 2019-05-19 RX ORDER — METOCLOPRAMIDE HCL 10 MG
10 TABLET ORAL ONCE
Refills: 0 | Status: COMPLETED | OUTPATIENT
Start: 2019-05-19 | End: 2019-05-19

## 2019-05-19 RX ORDER — SODIUM CHLORIDE 9 MG/ML
1000 INJECTION INTRAMUSCULAR; INTRAVENOUS; SUBCUTANEOUS ONCE
Refills: 0 | Status: COMPLETED | OUTPATIENT
Start: 2019-05-19 | End: 2019-05-19

## 2019-05-19 RX ADMIN — SODIUM CHLORIDE 1000 MILLILITER(S): 9 INJECTION INTRAMUSCULAR; INTRAVENOUS; SUBCUTANEOUS at 20:32

## 2019-05-19 RX ADMIN — Medication 10 MILLIGRAM(S): at 20:29

## 2019-05-19 NOTE — ED PROVIDER NOTE - FAMILY HISTORY
Father  Still living? Yes, Estimated age: Age Unknown  Family history of acute myocardial infarction, Age at diagnosis: Age Unknown

## 2019-05-19 NOTE — ED ADULT NURSE NOTE - OBJECTIVE STATEMENT
c/o about one week of body aches, tiredness, productive cough with white sputum and loss of appetite as well as discomfort in LLQ of abdomen at site of previous hernia repair with mesh. Patient has an upcming  C/o occasional nausea but denies vomiting or diarrhea. Tolerating po food and fluids at home but has decreased appetite. Denies any sick contact and denies any recent foreign travel. Breathing is unlabored. Abdomen is soft and nondistended, mildly tender in LLQ. Skin is warm dry and intact.

## 2019-05-19 NOTE — ED PROVIDER NOTE - CLINICAL SUMMARY MEDICAL DECISION MAKING FREE TEXT BOX
ATTG: : cough / fever / short of breath concern for pna / infection - will check labs, check xray chest, ivf, re eval for dispo

## 2019-05-19 NOTE — ED PROVIDER NOTE - PHYSICAL EXAMINATION
PHYSICAL EXAM:  General: No acute distress.  HEENT: NCAT.  PERRL.  EOMI.  No scleral icterus or injection.  Moist MM.   Neck: Supple.  Full ROM.  No JVD.  No thyromegaly.   Heart: RRR.  Normal S1 and S2.  No murmurs, rubs, or gallops.   Lungs: +RUL wheeze   Abdomen: BS+, soft, NT/ND.  No organomegaly. Mild tenderness in L groin. No masses/herniation appreciated  Skin: Warm and dry.  No rashes.  Extremities: No edema, clubbing, or cyanosis.    Neuro: A&Ox3.  CN II-XII intact.  5/5 strength in UE and LE b/l.  Tactile sensation intact in UE and LE b/l.

## 2019-05-19 NOTE — ED PROVIDER NOTE - ATTENDING CONTRIBUTION TO CARE
34 y/o m with pmhx denies, surg hx hernia repair at [facility] for inguinal hernia presents for concern of cough / fever / return of hernias and generalized fatigue. patient states approx 3 weeks ago started having some sensation on left side lower pelvis and was worried that his hernia came back. a week later started having cough and shortness of breath. now with dyspnea on exertion. tactile fever. no vomiting but having nausea. no diarrhea. no abd pain. no back pain. admits to having 10 lb unintentional weight loss.   no recent travel. denies any freq/ urg / dysuria. no penile discharge. no hx stis.  Gen.  no acute resp distress  HEENT:  perrl eomi   Lungs:  b/l bs  CVS: S1S2   Abd;  soft, no guarding. mild periumbilical discomfort.   Ext: no pitting edema or erythema   exam - no testicular masses or tenderness. no lesions. circumcised. no hernias palpated.   Neuro:aaox3 no focal deficits  MSK: strength 5/5 b/l upper and lower ext strength

## 2019-05-19 NOTE — ED PROVIDER NOTE - PROGRESS NOTE DETAILS
pt signed out ot me by agatha, pending cxr, plan for ct a/p if no sig findings. ct a/p non-actionable. pt re-evaluated, feels somewhat improved but continues to have myalgias. is most c/w viral illness. pt wishes to be d/c'd home. return precautions d/w patient, who expresses understanding. an opportunity to ask questions was provided and all answered. - Stan De Oliveira MD breanna pgy1: D/w pt likely viral etiology and results, tolerating PO, gave return precautions, will d/c

## 2019-05-19 NOTE — ED PROVIDER NOTE - NSFOLLOWUPINSTRUCTIONS_ED_ALL_ED_FT
(1) Follow up with your primary care physician as discussed  (2) Immediately seek care at your nearest emergency room if your worsen, persist, or do not resolve

## 2019-05-19 NOTE — ED PROVIDER NOTE - NS ED ROS FT
REVIEW OF SYSTEMS:  CONSTITUTIONAL: No weakness, fevers   EYES/ENT: No visual changes;  No vertigo or throat pain   NECK: No pain or stiffness  RESPIRATORY: +cough, wheezing, SOB  CARDIOVASCULAR: +SOB, No palpitations  GASTROINTESTINAL: +nausea, no vomiting;  No diarrhea or constipation. No hemetemesis, melena or hematochezia.  GENITOURINARY: No dysuria, frequency or hematuria  NEUROLOGICAL: No numbness or weakness  SKIN: No itching, burning, rashes, or lesions   All other review of systems is negative unless indicated above.

## 2019-05-19 NOTE — ED ADULT NURSE REASSESSMENT NOTE - NS ED NURSE REASSESS COMMENT FT1
Patient discharged from ED. IV removed, discharge paperwork provided by ED MD. Patient leaving unit ambulatory, free from harm.

## 2019-05-19 NOTE — ED PROVIDER NOTE - OBJECTIVE STATEMENT
Pt is a 33yM with a pmh of inguinal hernia repair b/l 2years ago, came in d/t 3-4 days of productive cough of white sputum. Pt states he started coughing white sputum, feeling body aches all over, and shortness of breath 3-4 days ago. Cough has been getting progressively worse, and nothing really makes it better or worse. Had associated chills, but no fevers. Reports mild sternal chest pain when he coughs. He denies any sick contacts. Pt does not use any inhaler or have any lung disease that he is aware of. Pt also reports 3wk hx of left groin pain. Pain doesn't radiate, not associated with urination. Denies dysuria. Pt denies penile discharge, testicular pain/swelling, has unprotected sex with just his wife. No hx of stds. Denies N/V/D, palpitations, weakness or numbness anywhere.

## 2019-05-19 NOTE — ED ADULT NURSE REASSESSMENT NOTE - NS ED NURSE REASSESS COMMENT FT1
Patient report received from Ginette ALY. Patient c/o generalized pain 7/10 to whole body worse with positioning. Patient is a/ox4, ambulatory without difficulty. Also c/o nausea. Patient medicated as ordered. Pending CT. VSS, afebrile Safety maintained.

## 2019-05-22 ENCOUNTER — APPOINTMENT (OUTPATIENT)
Dept: INTERNAL MEDICINE | Facility: CLINIC | Age: 34
End: 2019-05-22

## 2019-05-23 ENCOUNTER — APPOINTMENT (OUTPATIENT)
Dept: INTERNAL MEDICINE | Facility: CLINIC | Age: 34
End: 2019-05-23
Payer: COMMERCIAL

## 2019-05-23 VITALS
SYSTOLIC BLOOD PRESSURE: 140 MMHG | DIASTOLIC BLOOD PRESSURE: 80 MMHG | HEART RATE: 87 BPM | OXYGEN SATURATION: 98 % | HEIGHT: 68.5 IN | BODY MASS INDEX: 32.96 KG/M2 | WEIGHT: 220 LBS

## 2019-05-23 DIAGNOSIS — Q45.3 OTHER CONGENITAL MALFORMATIONS OF PANCREAS AND PANCREATIC DUCT: ICD-10-CM

## 2019-05-23 PROCEDURE — 99213 OFFICE O/P EST LOW 20 MIN: CPT

## 2019-05-23 NOTE — ASSESSMENT
[FreeTextEntry1] : advised pt he is overdue for his CPE and to schedule it with his PMD DR Laurent within 1 month

## 2019-05-23 NOTE — HISTORY OF PRESENT ILLNESS
[FreeTextEntry8] : Pt reports for the past 3 weeks feeling, generalized weakness and SOB with exertion such as with 1 flight of stairs\par -pt also has some wheezing\par -cough also for the past 2 weeks\par -currently no abdominal pain - he feels some discomfort LLQ but no pain\par -bilateral inguinal hernia repair was about 2 years ago - advised pt to f/u with his surgeon regarding the LLQ pain\par \par He had gone to the ED at [facility] on 5/19/19 for LLQ abdominal pain and cough deemed to be viral in etiology\par \par CBC- WBC 16.5k\par CMP- WNL\par \par CXR - unremarkable\par \par CT Abdomen/pelvis - mild fatty infiltration of the pancreatic head, unchanged and colonic diverticulosis\par -no acute findings\par \par

## 2019-05-23 NOTE — PHYSICAL EXAM
[No Acute Distress] : no acute distress [Normal Oropharynx] : the oropharynx was normal [Supple] : supple [No Lymphadenopathy] : no lymphadenopathy [No Respiratory Distress] : no respiratory distress  [Clear to Auscultation] : lungs were clear to auscultation bilaterally [No Accessory Muscle Use] : no accessory muscle use [Normal Rate] : normal rate  [Regular Rhythm] : with a regular rhythm [Normal S1, S2] : normal S1 and S2 [Soft] : abdomen soft [Non Tender] : non-tender [Normal Bowel Sounds] : normal bowel sounds [de-identified] : prolonged expirations bilaterally

## 2019-05-23 NOTE — REVIEW OF SYSTEMS
[Wheezing] : wheezing [Cough] : cough [Sore Throat] : no sore throat [Abdominal Pain] : no abdominal pain

## 2019-06-18 ENCOUNTER — APPOINTMENT (OUTPATIENT)
Dept: INTERNAL MEDICINE | Facility: CLINIC | Age: 34
End: 2019-06-18
Payer: COMMERCIAL

## 2019-06-18 VITALS
OXYGEN SATURATION: 98 % | RESPIRATION RATE: 14 BRPM | HEART RATE: 67 BPM | HEIGHT: 68 IN | BODY MASS INDEX: 33.34 KG/M2 | DIASTOLIC BLOOD PRESSURE: 50 MMHG | SYSTOLIC BLOOD PRESSURE: 90 MMHG | WEIGHT: 220 LBS

## 2019-06-18 DIAGNOSIS — J20.9 ACUTE BRONCHITIS, UNSPECIFIED: ICD-10-CM

## 2019-06-18 DIAGNOSIS — K57.32 DIVERTICULITIS OF LARGE INTESTINE W/OUT PERFORATION OR ABSCESS W/OUT BLEEDING: ICD-10-CM

## 2019-06-18 PROCEDURE — 99214 OFFICE O/P EST MOD 30 MIN: CPT

## 2019-06-19 PROBLEM — K57.32 DIVERTICULITIS, COLON: Status: ACTIVE | Noted: 2019-05-23

## 2019-06-19 NOTE — HISTORY OF PRESENT ILLNESS
[de-identified] : Here for f/u of asthmatic bronchitis.  Had upper airway mucus, feeling of being tight, sob with stairs in midst of malaise, exposure.  Had little congestion/heaviness on sides of nose recalling his URI history, but no sore throat, ear pain, h/a, fever, GI syx, myalgia.  Now all resolved.  Albuterol was helpful for his cough and robins.  \par \par In interim went to ER for L lower abd pain.  Points more to area a direct inguinal hernia would be.  Was sent to surg for opinion, who could  not document any hernia there.  Also had a CT for it in ER visit, told he had diverticula.  Saw Dr. Davila since, who discussed fiber, his being young for diverticular trouble and also seems to have given him a metronidazole/cipro/omeprazole combination (?h.pylori vs. treating diverticula and gastritis at the same time - asked for that office to send letter to clarify).   [FreeTextEntry1] : Here for f/u of an asthmatic bronchitis - see last note.

## 2019-06-19 NOTE — COUNSELING
[Weight management counseling provided] : Weight management [Healthy eating counseling provided] : healthy eating [Activity counseling provided] : activity [Good understanding] : Patient has a good understanding of lifestyle changes and the steps needed to achieve self management goals [Walking] : Walking [None] : None

## 2019-06-19 NOTE — ASSESSMENT
[FreeTextEntry1] : 1) asthmatic bronchitis resolved.  2) new problem - LLQ pain; feels like old hernia to pt, but can't document on exam here or with surgeon.  CT of abd for the pain w signs of diverticula, also had colonoscopy in past with Dr. Davila - told same.  Given diverticulitis regimen by Dr. Davila, with omeprazole.  To continue regimen for now.  Will get note from his office to clarify thoughts/plan.  3) cpe next, 3-4 mos.

## 2019-06-19 NOTE — PHYSICAL EXAM
[No Acute Distress] : no acute distress [Well Nourished] : well nourished [Well Developed] : well developed [No Respiratory Distress] : no respiratory distress  [Well-Appearing] : well-appearing [No Accessory Muscle Use] : no accessory muscle use [Normal Percussion] : the chest was normal to percussion [Clear to Auscultation] : lungs were clear to auscultation bilaterally [Regular Rhythm] : with a regular rhythm [Normal Rate] : normal rate  [Normal S1, S2] : normal S1 and S2 [No Murmur] : no murmur heard [No Carotid Bruits] : no carotid bruits [No Edema] : there was no peripheral edema [No Extremity Clubbing/Cyanosis] : no extremity clubbing/cyanosis [Soft] : abdomen soft [No HSM] : no HSM [Normal Bowel Sounds] : normal bowel sounds [Testes Mass (___cm)] : there were no testicular masses [Epididymis] : the epididymides were normal [Testes Tenderness] : no tenderness of the testes [de-identified] : vague discomfort w deep palpation LLQ; no hernia appreciated on valsalva across abdomen; no bulge in direct inguinal hernia site [de-identified] : no wheeze appreciated [FreeTextEntry1] : no epididymal bogginess or tenderness elicited; no SANDY, no indirect scrotal hernia appreciated

## 2019-06-19 NOTE — REVIEW OF SYSTEMS
[Fever] : no fever [Chills] : no chills [Night Sweats] : no night sweats [FreeTextEntry6] : see hpi [Negative] : Genitourinary [FreeTextEntry7] : see hpi

## 2019-08-30 ENCOUNTER — APPOINTMENT (OUTPATIENT)
Dept: INTERNAL MEDICINE | Facility: CLINIC | Age: 34
End: 2019-08-30

## 2019-09-20 ENCOUNTER — EMERGENCY (EMERGENCY)
Facility: HOSPITAL | Age: 34
LOS: 1 days | Discharge: HOME | End: 2019-09-20
Attending: EMERGENCY MEDICINE
Payer: COMMERCIAL

## 2019-09-20 VITALS
OXYGEN SATURATION: 99 % | RESPIRATION RATE: 16 BRPM | HEART RATE: 74 BPM | DIASTOLIC BLOOD PRESSURE: 74 MMHG | TEMPERATURE: 98 F | SYSTOLIC BLOOD PRESSURE: 108 MMHG

## 2019-09-20 VITALS
DIASTOLIC BLOOD PRESSURE: 73 MMHG | HEART RATE: 62 BPM | WEIGHT: 220.02 LBS | RESPIRATION RATE: 16 BRPM | TEMPERATURE: 98 F | OXYGEN SATURATION: 99 % | HEIGHT: 68 IN | SYSTOLIC BLOOD PRESSURE: 122 MMHG

## 2019-09-20 DIAGNOSIS — Z98.890 OTHER SPECIFIED POSTPROCEDURAL STATES: Chronic | ICD-10-CM

## 2019-09-20 DIAGNOSIS — Z98.89 OTHER SPECIFIED POSTPROCEDURAL STATES: Chronic | ICD-10-CM

## 2019-09-20 PROCEDURE — 99283 EMERGENCY DEPT VISIT LOW MDM: CPT

## 2019-09-20 PROCEDURE — 73130 X-RAY EXAM OF HAND: CPT

## 2019-09-20 PROCEDURE — 73130 X-RAY EXAM OF HAND: CPT | Mod: 26,RT

## 2019-09-20 RX ORDER — IBUPROFEN 200 MG
600 TABLET ORAL ONCE
Refills: 0 | Status: COMPLETED | OUTPATIENT
Start: 2019-09-20 | End: 2019-09-20

## 2019-09-20 RX ADMIN — Medication 600 MILLIGRAM(S): at 13:05

## 2019-09-20 RX ADMIN — Medication 600 MILLIGRAM(S): at 13:55

## 2019-09-20 NOTE — ED PROVIDER NOTE - CLINICAL SUMMARY MEDICAL DECISION MAKING FREE TEXT BOX
Felisha Vazquez): 32 y/o M with right hand injury 3 weeks ago, persistent pain with decreased ROM. Will give pain meds and will get x-ray.

## 2019-09-20 NOTE — ED ADULT NURSE NOTE - OBJECTIVE STATEMENT
32 y/o male with no significant pmhx biba from work with c/o allergic reaction.  per ems, pt has environmental allergies and began to experience itchy hives to face and hoarsed voice s/o rxn to perfume on the bus.  no other complaints noted.  no airway compromise.  no sob or respiratory distress appreciated.  pt is awake, alert and responsive to all stimuli.  pt recvd 25 mg benadryl via 20g R FA heplock.  no s/s of infiltration to insertion site.  erythema noted to face only.  safety precautions in place.  will continue to monitor. 32 y/o male 32 y/o Male seen in [place]/ Peds ER pt c/o rt hand pain s/p hand slammed in-between door and door-frame on 9/3.  Pt with right hand swelling.   No numbness/tingling.    Pt was seen by break coverage nurse Keenan Calixto.  Received report from nurse at 13:50pm.

## 2019-09-20 NOTE — ED PROVIDER NOTE - CARE PROVIDER_API CALL
Daren Duncan)  Orthopaedic Surgery  [address], [address]  [place] [address]  Phone: [phone]  Fax: [phone]  Follow Up Time:     Gabriel Teran (DO)  Plastic Surgery  [address] [place] [address]  Phone: [phone]  Fax: [phone]  Follow Up Time:

## 2019-09-20 NOTE — ED PROVIDER NOTE - NS_ ATTENDINGSCRIBEDETAILS _ED_A_ED_FT
Felisha Vazquez MD - Attending Physician: The scribe's documentation has been prepared under my direction and personally reviewed by me in its entirety. I confirm that the note above accurately reflects all work, treatment, procedures, and medical decision making performed by me.

## 2019-09-20 NOTE — ED PROVIDER NOTE - PATIENT PORTAL LINK FT
You can access the FollowMyHealth Patient Portal offered by [facility] by registering at the following website: [email or web address]. By joining [email or web address]’s FollowMyHealth portal, you will also be able to view your health information using other applications (apps) compatible with our system.

## 2019-09-20 NOTE — ED PROVIDER NOTE - MUSCULOSKELETAL MINIMAL EXAM
Tenderness and edema to the right hand overlying the distal fifth metacarpal. Decreased flexion and extension of fingers due to pain. No significant bruising. Healed punctate wound overlying site of pain. Distal sensation is intact. No sublux tenderness and no tenderness to the rest of his hand.

## 2019-09-20 NOTE — ED ADULT NURSE REASSESSMENT NOTE - NS ED NURSE REASSESS COMMENT FT1
Received report from break coverage nurse Katherine Man.  Pt with right hand swelling.  No acute respiratory distress noted, v/s obtained. Received report from [facility] coverage nurse Katherine Man at 13:50pm.  Pt with right  hand pain s/p hand slammed in-between door and door-frame on 9/3.  No numbness/tingling.  · Received report from break coverage nurse Katherine Man at 13:50pm.  Pt with right  hand pain s/p hand slammed in-between door and door-frame on 9/3.  Rt hand swelling.  No numbness/tingling.  · Pt discharged by Dr. Vazquez at 14:10pm.  · Pt discharged by Dr. Vazquez at 14:13pm.  ·

## 2019-09-20 NOTE — ED ADULT NURSE NOTE - NSIMPLEMENTINTERV_GEN_ALL_ED
Implemented All Universal Safety Interventions:  [place] to call system. Call bell, personal items and telephone within reach. Instruct patient to call for assistance. Room bathroom lighting operational. Non-slip footwear when patient is off stretcher. Physically safe environment: no spills, clutter or unnecessary equipment. Stretcher in lowest position, wheels locked, appropriate side rails in place.

## 2019-09-20 NOTE — ED PROVIDER NOTE - NSFOLLOWUPINSTRUCTIONS_ED_ALL_ED_FT
Thank you for visiting our Emergency Department, it has been a pleasure taking part in your healthcare.    Please follow up with your Primary Doctor as needed. Please follow-up with Hand/Orthopedics if you continue to have pain.        Hand Contusion  A hand contusion is a deep bruise to the hand. Contusions are the result of a blunt injury to tissues and muscle fibers under the skin. The injury causes bleeding under the skin. The skin overlying the contusion may turn blue, purple, or yellow. Minor injuries will give you a painless contusion, but more severe contusions may stay painful and swollen for a few weeks.    What are the causes?  A contusion is usually caused by a hard hit, trauma, or direct force to your hand, such as having a heavy object fall on your hand.    What are the signs or symptoms?  Symptoms of this condition include:  Swelling of the hand.  Pain and tenderness of the hand.  Discoloration of the hand. The area may have redness and then turn blue, purple, or yellow.  How is this diagnosed?  This condition is diagnosed from a physical exam and your medical history. An X-ray may be needed to see if there are any other injuries, such as broken bones (fractures). Sometimes, a CT scan or MRI may be needed if your health care provider is concerned that you may have torn or injured ligaments.    How is this treated?  An elastic wrap may be recommended to support your hand. In general, the best treatment for a hand contusion is rest, ice, pressure (compression), and elevation of the injured area. This is often called RICE therapy. Over-the-counter medicines may also be recommended for pain control.    Follow these instructions at home:  RICE Therapy     Rest the injured area.  If directed, apply ice to the injured area:  Put ice in a plastic bag.  Place a towel between your skin and the bag.  Leave the ice on for 20 minutes, 2–3 times a day.  If directed, apply light compression to the injured area using an elastic wrap. Make sure the wrap is not too tight. Remove and reapply the wrap as told by your health care provider. If your fingers become numb, cold, or blue, take the wrap off and reapply it more loosely.  Raise (elevate) the injured area above the level of your heart while you are sitting or lying down.  General instructions     Take over-the-counter and prescription medicines only as told by your health care provider.  Protect your hand from getting injured further.  Keep all follow-up visits as told by your health care provider. This is important.  Contact a health care provider if:  Your symptoms do not improve after several days of treatment.  You have increased redness, swelling, or pain in your hand or fingers.  You have difficulty moving the injured area.  Your swelling or pain is not relieved with medicines.  Get help right away if:  You have severe pain.  Your hand or fingers become numb.  Your hand or fingers turn pale, blue, or cold.  You cannot move your hand or wrist.  Your hand is warm to the touch.  This information is not intended to replace advice given to you by your health care provider. Make sure you discuss any questions you have with your health care provider.

## 2019-09-20 NOTE — ED PROVIDER NOTE - OBJECTIVE STATEMENT
32 y/o M with no significant pmhx and no significant pshx presents to the ED c/o r. hand pain and r. wrist pain s/p hand slammed in-between door and door-frame on 9/3. Pt has an appointment with  for this issue but could not wait until then because of the pain. Pt has taken Advil and Tylenol with no relief, has not taken any pain medication today. 32 y/o M with no significant pmhx and no significant pshx presents to the ED c/o r. hand pain s/p hand slammed in-between door and door-frame on 9/3. [facility] (was through worker's comp so was unable to be seen sooner) but could not wait until then because of the pain. Pt has taken Advil and Tylenol with no relief, has not taken any pain medication today. No numbness/tingling.

## 2020-12-14 ENCOUNTER — APPOINTMENT (OUTPATIENT)
Dept: INTERNAL MEDICINE | Facility: CLINIC | Age: 35
End: 2020-12-14
Payer: COMMERCIAL

## 2020-12-14 VITALS
DIASTOLIC BLOOD PRESSURE: 70 MMHG | SYSTOLIC BLOOD PRESSURE: 110 MMHG | OXYGEN SATURATION: 97 % | WEIGHT: 222 LBS | HEART RATE: 84 BPM | BODY MASS INDEX: 33.76 KG/M2

## 2020-12-14 PROCEDURE — 99072 ADDL SUPL MATRL&STAF TM PHE: CPT

## 2020-12-14 PROCEDURE — 99214 OFFICE O/P EST MOD 30 MIN: CPT

## 2020-12-14 RX ORDER — CIPROFLOXACIN HYDROCHLORIDE 500 MG/1
500 TABLET, FILM COATED ORAL
Qty: 20 | Refills: 0 | Status: COMPLETED | COMMUNITY
Start: 2020-12-14 | End: 2020-12-24

## 2020-12-14 RX ORDER — METRONIDAZOLE 500 MG/1
500 TABLET ORAL
Qty: 30 | Refills: 0 | Status: ACTIVE | COMMUNITY
Start: 2020-12-14 | End: 1900-01-01

## 2020-12-14 NOTE — PHYSICAL EXAM
[No Acute Distress] : no acute distress [Well Nourished] : well nourished [Well Developed] : well developed [Well-Appearing] : well-appearing [Soft] : abdomen soft [Rounded] : rounded [Normal] : normal to percussion [LLQ] : in the left lower quadrant [No Mass] : no masses were palpated [No HSM] : no hepatosplenomegaly noted [External Hemorrhoid] : an external hemorrhoid was present [Tender, Swollen] : that was nontender and non-swollen [Thrombosed] : that was not thrombosed

## 2020-12-14 NOTE — HISTORY OF PRESENT ILLNESS
[FreeTextEntry8] : C/O hemorrhoids from front to back.  Bled once - thought it was more pus.  This was last Wednesday.  Has tried preparation H without improvement.  Not constipated.  Pain is a 6/10, the other day it was very severe.  Denies fever and BM's have been normal.  No blood or mucus mixed in with the stool.\par \par Has a h/o ulcerative colitis but has not seen GI in a few years (constable).

## 2020-12-14 NOTE — ASSESSMENT
[FreeTextEntry1] : 35-year-old male with a history of colitis now with 1-1/2 weeks of abdominal and rectal pain with an episode of pussy discharge which she thought was from hemorrhoids.  I suspect he may have been an abscess given the presence of the leiva on a hemorrhoid but it was nontender at this time and likely drained.  With the left lower quadrant tenderness in conjunction with the rectal findings I suspect his colitis is active.  Will cover for any infectious causes with Cipro and Flagyl over the next 10 days but I advised him to make an appointment with his gastroenterologist as soon as possible.  If symptoms do not improve over the next 3 to 5 days would then also pursue a CT scan of the abdomen and pelvis.

## 2020-12-21 PROBLEM — J20.9 BRONCHITIS WITH BRONCHOSPASM: Status: RESOLVED | Noted: 2019-05-23 | Resolved: 2020-12-21

## 2021-04-28 ENCOUNTER — APPOINTMENT (OUTPATIENT)
Dept: INTERNAL MEDICINE | Facility: CLINIC | Age: 36
End: 2021-04-28
Payer: COMMERCIAL

## 2021-04-28 VITALS
SYSTOLIC BLOOD PRESSURE: 100 MMHG | BODY MASS INDEX: 29.25 KG/M2 | HEIGHT: 68 IN | OXYGEN SATURATION: 97 % | DIASTOLIC BLOOD PRESSURE: 60 MMHG | HEART RATE: 83 BPM | WEIGHT: 193 LBS

## 2021-04-28 DIAGNOSIS — K92.1 MELENA: ICD-10-CM

## 2021-04-28 DIAGNOSIS — Z86.39 PERSONAL HISTORY OF OTHER ENDOCRINE, NUTRITIONAL AND METABOLIC DISEASE: ICD-10-CM

## 2021-04-28 PROCEDURE — G0442 ANNUAL ALCOHOL SCREEN 15 MIN: CPT

## 2021-04-28 PROCEDURE — 99395 PREV VISIT EST AGE 18-39: CPT

## 2021-04-28 PROCEDURE — 99072 ADDL SUPL MATRL&STAF TM PHE: CPT

## 2021-05-03 RX ORDER — ALBUTEROL SULFATE 90 UG/1
108 (90 BASE) AEROSOL, METERED RESPIRATORY (INHALATION)
Qty: 1 | Refills: 0 | Status: COMPLETED | COMMUNITY
Start: 2019-05-23 | End: 2021-05-03

## 2021-05-03 RX ORDER — OMEPRAZOLE 20 MG/1
20 CAPSULE, DELAYED RELEASE ORAL DAILY
Qty: 30 | Refills: 3 | Status: COMPLETED | COMMUNITY
Start: 2017-09-11 | End: 2021-05-03

## 2021-05-03 NOTE — ASSESSMENT
[FreeTextEntry1] : 1) HCM - flu vacc encouraged annually, [place] w new child . TDAP - wants next visit . COVID vaccine discussed at length - he is considering/hesitant.  NICOLE, SB, SD, sunblock, exercise/wt loss strategy discussed.  Full labs for cpe ordered- will get CBC w bouts of hematochezia, bmp/lipids.  2) asked him to see GI again to discuss course - believe needs a/another full colonoscopy to be sure no IBD/ulcers lower colon, check on whether he has diverticula, etc.  He will call.  \par

## 2021-05-03 NOTE — HEALTH RISK ASSESSMENT
[Very Good] : ~his/her~  mood as very good [] : No [Yes] : Yes [2 - 4 times a month (2 pts)] : 2-4 times a month (2 points) [1 or 2 (0 pts)] : 1 or 2 (0 points) [No] : In the past 12 months have you used drugs other than those required for medical reasons? No [No falls in past year] : Patient reported no falls in the past year [0] : 2) Feeling down, depressed, or hopeless: Not at all (0) [Audit-CScore] : 2 [None] : None [With Family] : lives with family [Employed] : employed [College] : College [] :  [# Of Children ___] : has [unfilled] children [High Risk Behavior] : no high risk behavior [Feels Safe at Home] : Feels safe at home [Fully functional (bathing, dressing, toileting, transferring, walking, feeding)] : Fully functional (bathing, dressing, toileting, transferring, walking, feeding) [Fully functional (using the telephone, shopping, preparing meals, housekeeping, doing laundry, using] : Fully functional and needs no help or supervision to perform IADLs (using the telephone, shopping, preparing meals, housekeeping, doing laundry, using transportation, managing medications and managing finances) [Reports changes in hearing] : Reports no changes in hearing [Reports changes in vision] : Reports no changes in vision [Reports normal functional visual acuity (ie: able to read med bottle)] : Reports normal functional visual acuity [Reports changes in dental health] : Reports no changes in dental health [Smoke Detector] : smoke detector [Carbon Monoxide Detector] : carbon monoxide detector [Guns at Home] : no guns at home [Safety elements used in home] : safety elements used in home [Seat Belt] :  uses seat belt [Sunscreen] : uses sunscreen [Travel to Developing Areas] : does not  travel to developing areas [TB Exposure] : is not being exposed to tuberculosis [Caregiver Concerns] : does not have caregiver concerns

## 2021-05-03 NOTE — HISTORY OF PRESENT ILLNESS
[FreeTextEntry1] : Here for annual exam [de-identified] : Here for annual exam and to f/u on reflux, hx diverticulitis, ?hematochezia of unclear etiology, overweight.  Has lost excellent weight; feels good, maintaining it, he says.  Has a child now, and wants to be as healthy as possible.  Has had diverticulitis at a young age, and tends to have many episodes of BRB.  With his BRB has a pain/?cramp focally just L of mid lower abd - feels this same area every time he goes through some hematochezia.   Was felt in 2013 to have possible diverticulitis/colitis, but CT did not reveal clear etiology of an episode of mucus/blood/pain at that time.  Has no emesis/n/v/anorexia/fever-chills associated with episodes.  Has been seeing a GI doc outside of system.  Had what sounds like ?flex sig once.  \par \par Otherwise card/pulm/ rosyx is negative.

## 2021-05-03 NOTE — PHYSICAL EXAM
[No Acute Distress] : no acute distress [Well Nourished] : well nourished [Well Developed] : well developed [Well-Appearing] : well-appearing [Normal Sclera/Conjunctiva] : normal sclera/conjunctiva [PERRL] : pupils equal round and reactive to light [EOMI] : extraocular movements intact [Normal Outer Ear/Nose] : the outer ears and nose were normal in appearance [Normal Oropharynx] : the oropharynx was normal [Normal TMs] : both tympanic membranes were normal [Normal Nasal Mucosa] : the nasal mucosa was normal [No JVD] : no jugular venous distention [No Lymphadenopathy] : no lymphadenopathy [Supple] : supple [Thyroid Normal, No Nodules] : the thyroid was normal and there were no nodules present [No Respiratory Distress] : no respiratory distress  [No Accessory Muscle Use] : no accessory muscle use [Clear to Auscultation] : lungs were clear to auscultation bilaterally [Normal Percussion] : the chest was normal to percussion [Normal Rate] : normal rate  [Regular Rhythm] : with a regular rhythm [Normal S1, S2] : normal S1 and S2 [No Murmur] : no murmur heard [No Carotid Bruits] : no carotid bruits [No Abdominal Bruit] : a ~M bruit was not heard ~T in the abdomen [No Varicosities] : no varicosities [Pedal Pulses Present] : the pedal pulses are present [No Edema] : there was no peripheral edema [No Palpable Aorta] : no palpable aorta [No Extremity Clubbing/Cyanosis] : no extremity clubbing/cyanosis [Soft] : abdomen soft [Non-distended] : non-distended [No Masses] : no abdominal mass palpated [No HSM] : no HSM [Normal Bowel Sounds] : normal bowel sounds [Normal Supraclavicular Nodes] : no supraclavicular lymphadenopathy [Normal Posterior Cervical Nodes] : no posterior cervical lymphadenopathy [Normal Anterior Cervical Nodes] : no anterior cervical lymphadenopathy [No CVA Tenderness] : no CVA  tenderness [No Spinal Tenderness] : no spinal tenderness [No Joint Swelling] : no joint swelling [Grossly Normal Strength/Tone] : grossly normal strength/tone [No Rash] : no rash [No Skin Lesions] : no skin lesions [Coordination Grossly Intact] : coordination grossly intact [No Focal Deficits] : no focal deficits [Normal Gait] : normal gait [Deep Tendon Reflexes (DTR)] : deep tendon reflexes were 2+ and symmetric [Speech Grossly Normal] : speech grossly normal [Memory Grossly Normal] : memory grossly normal [Normal Affect] : the affect was normal [Normal Mood] : the mood was normal [Normal Insight/Judgement] : insight and judgment were intact [de-identified] : sl tenderness across lower abd, L > R  [de-identified] : no suspicious nevi

## 2021-07-20 LAB
ANION GAP SERPL CALC-SCNC: 13 MMOL/L
BASOPHILS # BLD AUTO: 0.07 K/UL
BASOPHILS NFR BLD AUTO: 0.7 %
BUN SERPL-MCNC: 10 MG/DL
CALCIUM SERPL-MCNC: 10.5 MG/DL
CHLORIDE SERPL-SCNC: 105 MMOL/L
CHOLEST SERPL-MCNC: 146 MG/DL
CO2 SERPL-SCNC: 24 MMOL/L
CREAT SERPL-MCNC: 0.86 MG/DL
EOSINOPHIL # BLD AUTO: 0.13 K/UL
EOSINOPHIL NFR BLD AUTO: 1.4 %
GLUCOSE SERPL-MCNC: 80 MG/DL
HCT VFR BLD CALC: 44.6 %
HDLC SERPL-MCNC: 43 MG/DL
HGB BLD-MCNC: 14.7 G/DL
IMM GRANULOCYTES NFR BLD AUTO: 0.3 %
LDLC SERPL CALC-MCNC: 83 MG/DL
LYMPHOCYTES # BLD AUTO: 1.54 K/UL
LYMPHOCYTES NFR BLD AUTO: 16.4 %
MAN DIFF?: NORMAL
MCHC RBC-ENTMCNC: 29.7 PG
MCHC RBC-ENTMCNC: 33 GM/DL
MCV RBC AUTO: 90.1 FL
MONOCYTES # BLD AUTO: 0.57 K/UL
MONOCYTES NFR BLD AUTO: 6.1 %
NEUTROPHILS # BLD AUTO: 7.07 K/UL
NEUTROPHILS NFR BLD AUTO: 75.1 %
NONHDLC SERPL-MCNC: 103 MG/DL
PLATELET # BLD AUTO: 201 K/UL
POTASSIUM SERPL-SCNC: 4.6 MMOL/L
RBC # BLD: 4.95 M/UL
RBC # FLD: 13.3 %
SODIUM SERPL-SCNC: 142 MMOL/L
TRIGL SERPL-MCNC: 99 MG/DL
WBC # FLD AUTO: 9.41 K/UL

## 2022-01-14 ENCOUNTER — EMERGENCY (EMERGENCY)
Facility: HOSPITAL | Age: 37
LOS: 1 days | Discharge: HOME | End: 2022-01-14
Attending: EMERGENCY MEDICINE
Payer: COMMERCIAL

## 2022-01-14 VITALS
DIASTOLIC BLOOD PRESSURE: 82 MMHG | SYSTOLIC BLOOD PRESSURE: 132 MMHG | RESPIRATION RATE: 20 BRPM | WEIGHT: 244.93 LBS | HEIGHT: 68 IN | OXYGEN SATURATION: 98 % | HEART RATE: 86 BPM

## 2022-01-14 DIAGNOSIS — Z98.890 OTHER SPECIFIED POSTPROCEDURAL STATES: Chronic | ICD-10-CM

## 2022-01-14 DIAGNOSIS — Z98.89 OTHER SPECIFIED POSTPROCEDURAL STATES: Chronic | ICD-10-CM

## 2022-01-14 LAB
ALBUMIN SERPL ELPH-MCNC: 4.7 G/DL — SIGNIFICANT CHANGE UP (ref 3.3–5)
ALP SERPL-CCNC: 76 U/L — SIGNIFICANT CHANGE UP (ref 40–120)
ALT FLD-CCNC: 40 U/L — SIGNIFICANT CHANGE UP (ref 10–45)
ANION GAP SERPL CALC-SCNC: 19 MMOL/L — HIGH (ref 5–17)
AST SERPL-CCNC: 36 U/L — SIGNIFICANT CHANGE UP (ref 10–40)
BASE EXCESS BLDV CALC-SCNC: -0.7 MMOL/L — SIGNIFICANT CHANGE UP (ref -2–2)
BASOPHILS # BLD AUTO: 0.08 K/UL — SIGNIFICANT CHANGE UP (ref 0–0.2)
BASOPHILS NFR BLD AUTO: 0.6 % — SIGNIFICANT CHANGE UP (ref 0–2)
BILIRUB SERPL-MCNC: 0.2 MG/DL — SIGNIFICANT CHANGE UP (ref 0.2–1.2)
BUN SERPL-MCNC: 13 MG/DL — SIGNIFICANT CHANGE UP (ref 7–23)
CA-I SERPL-SCNC: 1.16 MMOL/L — SIGNIFICANT CHANGE UP (ref 1.15–1.33)
CALCIUM SERPL-MCNC: 9.7 MG/DL — SIGNIFICANT CHANGE UP (ref 8.4–10.5)
CHLORIDE BLDV-SCNC: 103 MMOL/L — SIGNIFICANT CHANGE UP (ref 96–108)
CHLORIDE SERPL-SCNC: 102 MMOL/L — SIGNIFICANT CHANGE UP (ref 96–108)
CO2 BLDV-SCNC: 24 MMOL/L — SIGNIFICANT CHANGE UP (ref 22–26)
CO2 SERPL-SCNC: 19 MMOL/L — LOW (ref 22–31)
CREAT SERPL-MCNC: 0.66 MG/DL — SIGNIFICANT CHANGE UP (ref 0.5–1.3)
EOSINOPHIL # BLD AUTO: 0.13 K/UL — SIGNIFICANT CHANGE UP (ref 0–0.5)
EOSINOPHIL NFR BLD AUTO: 0.9 % — SIGNIFICANT CHANGE UP (ref 0–6)
GAS PNL BLDV: 137 MMOL/L — SIGNIFICANT CHANGE UP (ref 136–145)
GAS PNL BLDV: SIGNIFICANT CHANGE UP
GAS PNL BLDV: SIGNIFICANT CHANGE UP
GLUCOSE BLDV-MCNC: 139 MG/DL — HIGH (ref 70–99)
GLUCOSE SERPL-MCNC: 144 MG/DL — HIGH (ref 70–99)
HCO3 BLDV-SCNC: 23 MMOL/L — SIGNIFICANT CHANGE UP (ref 22–29)
HCT VFR BLD CALC: 40.5 % — SIGNIFICANT CHANGE UP (ref 39–50)
HCT VFR BLDA CALC: 43 % — SIGNIFICANT CHANGE UP (ref 39–51)
HGB BLD CALC-MCNC: 14.2 G/DL — SIGNIFICANT CHANGE UP (ref 12.6–17.4)
HGB BLD-MCNC: 13.9 G/DL — SIGNIFICANT CHANGE UP (ref 13–17)
IMM GRANULOCYTES NFR BLD AUTO: 0.9 % — SIGNIFICANT CHANGE UP (ref 0–1.5)
LACTATE BLDV-MCNC: 5.1 MMOL/L — CRITICAL HIGH (ref 0.7–2)
LYMPHOCYTES # BLD AUTO: 24.6 % — SIGNIFICANT CHANGE UP (ref 13–44)
LYMPHOCYTES # BLD AUTO: 3.45 K/UL — HIGH (ref 1–3.3)
MCHC RBC-ENTMCNC: 29.6 PG — SIGNIFICANT CHANGE UP (ref 27–34)
MCHC RBC-ENTMCNC: 34.3 GM/DL — SIGNIFICANT CHANGE UP (ref 32–36)
MCV RBC AUTO: 86.2 FL — SIGNIFICANT CHANGE UP (ref 80–100)
MONOCYTES # BLD AUTO: 0.79 K/UL — SIGNIFICANT CHANGE UP (ref 0–0.9)
MONOCYTES NFR BLD AUTO: 5.6 % — SIGNIFICANT CHANGE UP (ref 2–14)
NEUTROPHILS # BLD AUTO: 9.43 K/UL — HIGH (ref 1.8–7.4)
NEUTROPHILS NFR BLD AUTO: 67.4 % — SIGNIFICANT CHANGE UP (ref 43–77)
NRBC # BLD: 0 /100 WBCS — SIGNIFICANT CHANGE UP (ref 0–0)
PCO2 BLDV: 36 MMHG — LOW (ref 42–55)
PH BLDV: 7.42 — SIGNIFICANT CHANGE UP (ref 7.32–7.43)
PLATELET # BLD AUTO: 256 K/UL — SIGNIFICANT CHANGE UP (ref 150–400)
PO2 BLDV: 53 MMHG — HIGH (ref 25–45)
POTASSIUM BLDV-SCNC: 3.8 MMOL/L — SIGNIFICANT CHANGE UP (ref 3.5–5.1)
POTASSIUM SERPL-MCNC: 3.6 MMOL/L — SIGNIFICANT CHANGE UP (ref 3.5–5.3)
POTASSIUM SERPL-SCNC: 3.6 MMOL/L — SIGNIFICANT CHANGE UP (ref 3.5–5.3)
PROT SERPL-MCNC: 7.2 G/DL — SIGNIFICANT CHANGE UP (ref 6–8.3)
RBC # BLD: 4.7 M/UL — SIGNIFICANT CHANGE UP (ref 4.2–5.8)
RBC # FLD: 13.1 % — SIGNIFICANT CHANGE UP (ref 10.3–14.5)
SAO2 % BLDV: 85.7 % — SIGNIFICANT CHANGE UP (ref 67–88)
SODIUM SERPL-SCNC: 140 MMOL/L — SIGNIFICANT CHANGE UP (ref 135–145)
TROPONIN T, HIGH SENSITIVITY RESULT: <6 NG/L — SIGNIFICANT CHANGE UP (ref 0–51)
WBC # BLD: 14 K/UL — HIGH (ref 3.8–10.5)
WBC # FLD AUTO: 14 K/UL — HIGH (ref 3.8–10.5)

## 2022-01-14 PROCEDURE — 36415 COLL VENOUS BLD VENIPUNCTURE: CPT

## 2022-01-14 PROCEDURE — 82435 ASSAY OF BLOOD CHLORIDE: CPT

## 2022-01-14 PROCEDURE — 80053 COMPREHEN METABOLIC PANEL: CPT

## 2022-01-14 PROCEDURE — 85025 COMPLETE CBC W/AUTO DIFF WBC: CPT

## 2022-01-14 PROCEDURE — 85379 FIBRIN DEGRADATION QUANT: CPT

## 2022-01-14 PROCEDURE — 84484 ASSAY OF TROPONIN QUANT: CPT

## 2022-01-14 PROCEDURE — 82962 GLUCOSE BLOOD TEST: CPT

## 2022-01-14 PROCEDURE — 71045 X-RAY EXAM CHEST 1 VIEW: CPT | Mod: 26

## 2022-01-14 PROCEDURE — 96374 THER/PROPH/DIAG INJ IV PUSH: CPT

## 2022-01-14 PROCEDURE — 96375 TX/PRO/DX INJ NEW DRUG ADDON: CPT

## 2022-01-14 PROCEDURE — 84295 ASSAY OF SERUM SODIUM: CPT

## 2022-01-14 PROCEDURE — 85018 HEMOGLOBIN: CPT

## 2022-01-14 PROCEDURE — 71045 X-RAY EXAM CHEST 1 VIEW: CPT

## 2022-01-14 PROCEDURE — 82330 ASSAY OF CALCIUM: CPT

## 2022-01-14 PROCEDURE — 82803 BLOOD GASES ANY COMBINATION: CPT

## 2022-01-14 PROCEDURE — 83605 ASSAY OF LACTIC ACID: CPT

## 2022-01-14 PROCEDURE — 82947 ASSAY GLUCOSE BLOOD QUANT: CPT

## 2022-01-14 PROCEDURE — 82565 ASSAY OF CREATININE: CPT

## 2022-01-14 PROCEDURE — 99285 EMERGENCY DEPT VISIT HI MDM: CPT

## 2022-01-14 PROCEDURE — 84132 ASSAY OF SERUM POTASSIUM: CPT

## 2022-01-14 PROCEDURE — 85014 HEMATOCRIT: CPT

## 2022-01-14 PROCEDURE — 99284 EMERGENCY DEPT VISIT MOD MDM: CPT | Mod: 25

## 2022-01-14 PROCEDURE — 93005 ELECTROCARDIOGRAM TRACING: CPT

## 2022-01-14 RX ORDER — NALOXONE HYDROCHLORIDE 4 MG/.1ML
0.4 SPRAY NASAL ONCE
Refills: 0 | Status: COMPLETED | OUTPATIENT
Start: 2022-01-14 | End: 2022-01-14

## 2022-01-14 RX ORDER — SODIUM CHLORIDE 9 MG/ML
1000 INJECTION INTRAMUSCULAR; INTRAVENOUS; SUBCUTANEOUS ONCE
Refills: 0 | Status: COMPLETED | OUTPATIENT
Start: 2022-01-14 | End: 2022-01-14

## 2022-01-14 RX ORDER — ONDANSETRON 8 MG/1
4 TABLET, FILM COATED ORAL ONCE
Refills: 0 | Status: COMPLETED | OUTPATIENT
Start: 2022-01-14 | End: 2022-01-14

## 2022-01-14 RX ADMIN — ONDANSETRON 4 MILLIGRAM(S): 8 TABLET, FILM COATED ORAL at 23:05

## 2022-01-14 RX ADMIN — SODIUM CHLORIDE 1000 MILLILITER(S): 9 INJECTION INTRAMUSCULAR; INTRAVENOUS; SUBCUTANEOUS at 23:05

## 2022-01-14 RX ADMIN — NALOXONE HYDROCHLORIDE 0.4 MILLIGRAM(S): 4 SPRAY NASAL at 23:26

## 2022-01-14 NOTE — ED PROVIDER NOTE - PATIENT PORTAL LINK FT
You can access the FollowMyHealth Patient Portal offered by [facility] by registering at the following website: [email or web address]. By joining "[employer]"’s FollowMyHealth portal, you will also be able to view your health information using other applications (apps) compatible with our system.

## 2022-01-14 NOTE — ED PROVIDER NOTE - NSICDXFAMILYHX_GEN_ALL_CORE_FT
FAMILY HISTORY:  Father  Still living? Yes, Estimated age: Age Unknown  Family history of acute myocardial infarction, Age at diagnosis: Age Unknown

## 2022-01-14 NOTE — ED PROVIDER NOTE - CLINICAL SUMMARY MEDICAL DECISION MAKING FREE TEXT BOX
Patient is a 36 year-old-male with history of anxiety presents with alcohol intoxication and marijuana use and c/o 1-month history of SOB. Unremarkable exam. Will monitor for alcohol intoxication. In the setting of recent COVID infection and SOB, will checks labs, d-dimer, ECG and CXR. Reassess.

## 2022-01-14 NOTE — ED PROVIDER NOTE - PHYSICAL EXAMINATION
General: non-toxic appearing, in no respiratory distress, appears drowsy  HEENT: atraumatic, normocephalic; pupils are equal, round and react to light, extraocular movements intact bilaterally without deficits, no conjunctival pallor, mucous membranes moist  Neck: no jugular venous distension, full range of motion  Chest/Lung: clear to auscultation bilaterally, no wheezes/rhonchi/rales  Heart: regular rate and rhythm, no murmur/gallops/rubs  Abdomen: normal bowel sounds, soft, non-tender, non-distended  Extremities: no lower extremity edema, +2 radial pulses bilaterally, +2 dorsalis pedis pulses bilaterally  Musculoskeletal: full range of motion of all 4 extremities with 5/5 strength  Nervous System: alert and oriented, no motor deficits or sensory deficits; CNII-XII grossly intact; no focal neurologic deficits  Skin: no rashes/lacerations noted

## 2022-01-14 NOTE — ED PROVIDER NOTE - NSFOLLOWUPINSTRUCTIONS_ED_ALL_ED_FT
You were seen in the emergency department for alcohol intoxication and shortness of breath.     For pain, you may take Tylenol (acetaminophen) and/or ibuprofen (advil or motrin). Please follow the instructions on the label/container.     Please follow up with your primary care doctor within 48 hours for continuation of your care.   Please follow up with pulmonary in a week for further management of shortness of breath.     Return to the emergency department if you experience any new/concerning/worsening symptoms such as but not limited to: fever (>100.3F), intractable nausea, vomiting, chest pain, worsening shortness of breath, difficulty breathing, or any other concerning symptoms.

## 2022-01-14 NOTE — ED PROVIDER NOTE - ATTENDING CONTRIBUTION TO CARE
36M hx anxiety, no meds, recent COVID infxn dx 12/28, marijuana abuse here with alcohol intox and SOB. Pt states SOB for past 1 month since COVID dx, SOB is constant, no leg swelling, no cp or syncope. No hx of VTE. Pt endorses drinking alcohol tonight and smoking marijuana. Denies SI or HI. Pt is intox appearing NAD, NCAT PERRL, EOMI, normal mm, RRR, lungs CTA BL, abd soft ntnd, ext wwp, no calf swelling or TTP, awake, alert oriented x3, intoxicated. Monitor pending sobriety, however w c/o constant SOB and recent COVID check labs, EKG, CXR, dimer. Sat 100%, HR in 70s, low likelihood for PE.

## 2022-01-14 NOTE — ED ADULT NURSE NOTE - CHIEF COMPLAINT QUOTE
intox, found in car in driveway by girlfriend who called 911 after patient drove home intox states drnak bottle of [place]

## 2022-01-14 NOTE — ED ADULT NURSE NOTE - OBJECTIVE STATEMENT
Pt is a 35 y/o male pmhx of anxiety, COVID + (12/18/21) presents to the ED BIBA after wife found pt intoxicated in driveway. Per EMS, pt was vomiting and endorsed drinking half a liter of Deena & smoking weed. Pt states "Its my anxiety, every since I have had COVID I have had loss of sense and smell and so drinking didn't help". Pt says once a weekend he drinks that much but denies ever withdrawing from alcohol. Pt presents pale in color, lethargic and arousal to verbal & physical stimuli and has periods of apneic breathing and sating 75% on RA. PERRL 3mm. On cardiac monitor, sinus bhumika 50's. Abdomen soft & nondistended. Spontaneously moving all extremities, normally ambulates without assist. Strong peripheral pulses noted b/l, no edema noted. Skin warm, clean, dry & intact. Denies chest pain, SOB, abdominal pain, back pain, n/v/d, fever, chills, changes in vision. Call bell within reach, bed in lowest position, side rails up, wheels locked. Pt is a 37 y/o male pmhx of anxiety, COVID + (12/18/21) presents to the ED BIBA after wife found pt intoxicated in driveway. Per EMS, pt was vomiting and endorsed drinking half a liter of Deena & smoking weed. Pt states "Its my anxiety, every since I have had COVID I have had loss of sense and smell and so drinking didn't help". Pt says once a weekend he drinks that much but denies ever withdrawing from alcohol. MD Santos at bedside, Pt presents pale in color, lethargic and arousal to verbal & physical stimuli, has periods of apneic breathing and sating 75% on RA. PERRL 3mm. On cardiac monitor, sinus bhumika 50's. Abdomen soft & nondistended. Spontaneously moving all extremities, normally ambulates without assist. Strong peripheral pulses noted b/l, no edema noted. Skin warm, clean, dry & intact. Denies chest pain, SOB, abdominal pain, back pain, n/v/d, fever, chills, changes in vision. Call bell within reach, bed in lowest position, side rails up, wheels locked.

## 2022-01-14 NOTE — ED PROVIDER NOTE - NSFOLLOWUPCLINICS_GEN_ALL_ED_FT
[facility]  Pulmonology  [address] [place] [address], [place] [address]  Phone: [phone]  Fax:

## 2022-01-14 NOTE — ED PROVIDER NOTE - PROGRESS NOTE DETAILS
Garry PGY1  Patient stable, O2 sat stable with stable end-tidal CO2 at bedside. Will continue to monitor. Garry PGY1  Patient is going in-and-out of apneic episode, concerning for opoid use. Ordered Narcan.   Placed on end-tidal CO2 for monitoring. Garyr PGY1  Patient is walking okay. Reports feeling better.   Alert and oriented x4. Intact cerebellar function.   Safe to discharge.

## 2022-01-14 NOTE — ED PROVIDER NOTE - OBJECTIVE STATEMENT
Patient is a 36 year-old-male with history of anxiety presents with alcohol intoxication and marijuana use. Patient is a 36 year-old-male with history of anxiety presents with alcohol intoxication and marijuana use. C/o SOB for a month now since COVID infection. Constant, worsen with exertion, no leg swelling, no chest pain, no syncope. No history of prior DVT/PE. Patient is a 36 year-old-male with history of anxiety presents with alcohol intoxication and marijuana use. C/o SOB for a month now since COVID infection. Constant, worsen with exertion, no leg swelling, no chest pain, no syncope. No history of prior DVT/PE. Denies SI/HI.

## 2022-01-15 VITALS
DIASTOLIC BLOOD PRESSURE: 55 MMHG | HEART RATE: 94 BPM | SYSTOLIC BLOOD PRESSURE: 106 MMHG | OXYGEN SATURATION: 98 % | RESPIRATION RATE: 14 BRPM | TEMPERATURE: 98 F

## 2022-01-15 LAB — D DIMER BLD IA.RAPID-MCNC: <150 NG/ML DDU — SIGNIFICANT CHANGE UP

## 2022-01-15 NOTE — ED ADULT NURSE REASSESSMENT NOTE - NS ED NURSE REASSESS COMMENT FT1
Pt alert & oriented x 4. Pt speaking clearly, breathing spontaneous & nonlabored. Pt demonstrating steady gait to RN & MD

## 2022-03-02 NOTE — ED PROVIDER NOTE - QRS
----- Message from Jamal Zambrano, Aria Hidalgo sent at 3/2/2022  2:30 PM EST -----  Explain infection may have caused an abscess.  Will treat as PID (TOA) with rocephin 500 IM x1, doxycycline 100 BID x 14 days and flagyl 500 BID x 14 days; then repeat sono and  in 4 weeks review danger signs of worsening pain
Called and explained results to patient. She is coming tomorrow at 10 am for rocephin injection. Explained the medications that she needs to take. She has FU scheduled for US and Radha Osman on 4/1 and 4/5. She would like to continue with tl/ablation. Told her that Suri Pereira would be in contact to discuss scheduling.
98

## 2022-03-25 ENCOUNTER — APPOINTMENT (OUTPATIENT)
Dept: PULMONOLOGY | Facility: CLINIC | Age: 37
End: 2022-03-25
Payer: COMMERCIAL

## 2022-03-25 VITALS
HEIGHT: 68 IN | HEART RATE: 64 BPM | DIASTOLIC BLOOD PRESSURE: 82 MMHG | SYSTOLIC BLOOD PRESSURE: 114 MMHG | BODY MASS INDEX: 30.31 KG/M2 | WEIGHT: 200 LBS | TEMPERATURE: 98.1 F | OXYGEN SATURATION: 98 %

## 2022-03-25 VITALS — RESPIRATION RATE: 15 BRPM | HEART RATE: 76 BPM | OXYGEN SATURATION: 99 %

## 2022-03-25 DIAGNOSIS — R06.00 DYSPNEA, UNSPECIFIED: ICD-10-CM

## 2022-03-25 PROCEDURE — 94010 BREATHING CAPACITY TEST: CPT

## 2022-03-25 PROCEDURE — ZZZZZ: CPT

## 2022-03-25 PROCEDURE — 99203 OFFICE O/P NEW LOW 30 MIN: CPT | Mod: 25

## 2022-03-25 PROCEDURE — 94729 DIFFUSING CAPACITY: CPT

## 2022-03-25 PROCEDURE — 94726 PLETHYSMOGRAPHY LUNG VOLUMES: CPT

## 2022-03-25 NOTE — HISTORY OF PRESENT ILLNESS
[TextBox_4] : 36-year-old male here for evaluation, persistent intermittent dyspnea post COVID in the end of December 2021.  Patient unvaccinated.  He had about 4 days of high fevers, cough.  Also gained about 7 pounds while staying home after COVID.\par Prior to COVID, he had been going to the gym regularly.  Running on the treadmill, about 1 mile in 9 minutes.  And lifting weights.\par \par Since then, he notes intermittent episodes of dyspnea with exertion.  He also notes that he has a history of anxiety, feels this may be contributing.  Sometimes he wakes up at night, feeling his heart beating fast, and shortness of breath, that he feels his anxiety.  Sometimes he gets out of breath when walking 20 or 30 feet, has to catch his breath for talking.  Other times he can walk an unlimited distance without symptoms.  He works as a .  He has gone back to the gym.  He can do a mile on the treadmill, but now takes about 14 or 15 minutes.  He is lifting weights instead.\par \par He had a chest x-ray on January 15 that was normal.\par \par He is a non-smoker of cigarettes.  Smokes marijuana, so that helps him with his anxiety.  He does not vape.\par \par Denies other past medical history.  Takes no medications.

## 2022-03-25 NOTE — PHYSICAL EXAM
[No Acute Distress] : no acute distress [Normal Oropharynx] : normal oropharynx [Normal Appearance] : normal appearance [No Neck Mass] : no neck mass [Normal Rate/Rhythm] : normal rate/rhythm [Normal S1, S2] : normal s1, s2 [No Murmurs] : no murmurs [No Resp Distress] : no resp distress [Clear to Auscultation Bilaterally] : clear to auscultation bilaterally [No Abnormalities] : no abnormalities [Benign] : benign [Normal Gait] : normal gait [No Clubbing] : no clubbing [No Cyanosis] : no cyanosis [No Edema] : no edema [FROM] : FROM [Normal Color/ Pigmentation] : normal color/ pigmentation [No Focal Deficits] : no focal deficits [Oriented x3] : oriented x3 [Normal Affect] : normal affect [TextBox_11] : Deviated septum

## 2022-03-25 NOTE — DISCUSSION/SUMMARY
[FreeTextEntry1] : Patient had some difficulty performing PFTs, but despite this he has a normal FEV1, normal lung volumes, and normal diffusion capacity.  There is no evidence of intrinsic lung disease.\par \par 36-year-old male, unvaccinated, status post Covid in December, normal chest x-ray in January, with intermittent symptoms of dyspnea with exertion, as well as nocturnal symptoms that may be consistent with panic attacks.\par Also with deconditioning and some weight gain after COVID\par We will check labs today.\par And ordering a cardiopulmonary exercise test for further evaluation

## 2022-03-25 NOTE — REVIEW OF SYSTEMS
[Fatigue] : fatigue [Recent Wt Gain (___ Lbs)] : ~T recent [unfilled] lb weight gain [Negative] : Endocrine [TextBox_30] : HPI [TextBox_44] : HPI [TextBox_134] : HPI

## 2022-03-28 ENCOUNTER — NON-APPOINTMENT (OUTPATIENT)
Age: 37
End: 2022-03-28

## 2022-03-28 LAB
ALBUMIN SERPL ELPH-MCNC: 5 G/DL
ALP BLD-CCNC: 57 U/L
ALT SERPL-CCNC: 27 U/L
ANION GAP SERPL CALC-SCNC: 13 MMOL/L
AST SERPL-CCNC: 27 U/L
BASOPHILS # BLD AUTO: 0.07 K/UL
BASOPHILS NFR BLD AUTO: 1 %
BILIRUB SERPL-MCNC: 0.7 MG/DL
BUN SERPL-MCNC: 14 MG/DL
CALCIUM SERPL-MCNC: 9.7 MG/DL
CHLORIDE SERPL-SCNC: 105 MMOL/L
CO2 SERPL-SCNC: 22 MMOL/L
CREAT SERPL-MCNC: 0.72 MG/DL
CRP SERPL-MCNC: <3 MG/L
DEPRECATED D DIMER PPP IA-ACNC: <150 NG/ML DDU
EGFR: 121 ML/MIN/1.73M2
EOSINOPHIL # BLD AUTO: 0.15 K/UL
EOSINOPHIL NFR BLD AUTO: 2.1 %
FERRITIN SERPL-MCNC: 98 NG/ML
GLUCOSE SERPL-MCNC: 93 MG/DL
HCT VFR BLD CALC: 43.4 %
HGB BLD-MCNC: 14.4 G/DL
IMM GRANULOCYTES NFR BLD AUTO: 0.3 %
LYMPHOCYTES # BLD AUTO: 1.58 K/UL
LYMPHOCYTES NFR BLD AUTO: 22.5 %
MAN DIFF?: NORMAL
MCHC RBC-ENTMCNC: 29.5 PG
MCHC RBC-ENTMCNC: 33.2 GM/DL
MCV RBC AUTO: 88.9 FL
MONOCYTES # BLD AUTO: 0.43 K/UL
MONOCYTES NFR BLD AUTO: 6.1 %
NEUTROPHILS # BLD AUTO: 4.78 K/UL
NEUTROPHILS NFR BLD AUTO: 68 %
PLATELET # BLD AUTO: 212 K/UL
POTASSIUM SERPL-SCNC: 4.2 MMOL/L
PROT SERPL-MCNC: 7.1 G/DL
RBC # BLD: 4.88 M/UL
RBC # FLD: 12.6 %
SODIUM SERPL-SCNC: 139 MMOL/L
WBC # FLD AUTO: 7.03 K/UL

## 2022-03-30 ENCOUNTER — APPOINTMENT (OUTPATIENT)
Dept: INTERNAL MEDICINE | Facility: CLINIC | Age: 37
End: 2022-03-30
Payer: COMMERCIAL

## 2022-03-30 VITALS
DIASTOLIC BLOOD PRESSURE: 60 MMHG | HEIGHT: 68 IN | SYSTOLIC BLOOD PRESSURE: 120 MMHG | WEIGHT: 200 LBS | OXYGEN SATURATION: 98 % | BODY MASS INDEX: 30.31 KG/M2 | HEART RATE: 69 BPM

## 2022-03-30 PROCEDURE — 99214 OFFICE O/P EST MOD 30 MIN: CPT

## 2022-04-04 NOTE — HISTORY OF PRESENT ILLNESS
[FreeTextEntry8] : Comes to talk mostly about fact his breathing has been off since a case of COVID in 12/21.  He recuperated from his acute case, but has been left with some fatigue at times, but mostly noticing that at work while working as a , and sometimes at home going room to room, and sometimes for exercise he's trying to do, he loses his normal breath.  Has to slow down, catch it, take deeper breaths.  He is now 3-4 mos out, and feels it may be slightly less bothersome, but concerned.  Has no audible wheeze, no orthopnea, edema palpitations, associated.  Has no cp w exertion either.  Has not been left with any foggy thinking, change in smell/taste, palpitations on standing, etc.

## 2022-04-04 NOTE — PHYSICAL EXAM
[No Acute Distress] : no acute distress [Well Nourished] : well nourished [Well Developed] : well developed [Well-Appearing] : well-appearing [Normal Sclera/Conjunctiva] : normal sclera/conjunctiva [Normal Oropharynx] : the oropharynx was normal [No JVD] : no jugular venous distention [No Lymphadenopathy] : no lymphadenopathy [Supple] : supple [Thyroid Normal, No Nodules] : the thyroid was normal and there were no nodules present [No Respiratory Distress] : no respiratory distress  [No Accessory Muscle Use] : no accessory muscle use [Clear to Auscultation] : lungs were clear to auscultation bilaterally [Normal Percussion] : the chest was normal to percussion [Normal Rate] : normal rate  [Regular Rhythm] : with a regular rhythm [Normal S1, S2] : normal S1 and S2 [No Murmur] : no murmur heard [No Edema] : there was no peripheral edema [No Extremity Clubbing/Cyanosis] : no extremity clubbing/cyanosis [Normal Supraclavicular Nodes] : no supraclavicular lymphadenopathy [Normal Posterior Cervical Nodes] : no posterior cervical lymphadenopathy [Normal Anterior Cervical Nodes] : no anterior cervical lymphadenopathy [de-identified] : no pallor at conj noted [de-identified] : no pharyngeal crowding noted

## 2022-04-04 NOTE — ASSESSMENT
[FreeTextEntry1] : 1) declines flu vacc; had long talk about COVID vacc ('it's too soon, I want to see it out longer') - covered his concerns, reminded original vaccine volunteers were large numbers, now into second year of being followed for any AE.  He will consider - urged.  2) believe his windedness is still his healing in his upper airway, ?label of long covid or not, unclear.  Just saw the [facility] program here for folks with persistent syx.  He is to have a pulm stress test, did labs that were unremarkable.  PFTs were essentially normal.  Reassured if he is healing slowly in mucosa of upper airway it should really be time limited.

## 2022-04-11 ENCOUNTER — TRANSCRIPTION ENCOUNTER (OUTPATIENT)
Age: 37
End: 2022-04-11

## 2022-04-14 ENCOUNTER — APPOINTMENT (OUTPATIENT)
Dept: PULMONOLOGY | Facility: CLINIC | Age: 37
End: 2022-04-14
Payer: COMMERCIAL

## 2022-04-14 PROCEDURE — 94375 RESPIRATORY FLOW VOLUME LOOP: CPT

## 2022-04-14 PROCEDURE — 94621 CARDIOPULM EXERCISE TESTING: CPT

## 2022-04-26 ENCOUNTER — APPOINTMENT (OUTPATIENT)
Dept: PULMONOLOGY | Facility: CLINIC | Age: 37
End: 2022-04-26

## 2022-04-28 ENCOUNTER — TRANSCRIPTION ENCOUNTER (OUTPATIENT)
Age: 37
End: 2022-04-28

## 2022-05-10 ENCOUNTER — APPOINTMENT (OUTPATIENT)
Dept: INTERNAL MEDICINE | Facility: CLINIC | Age: 37
End: 2022-05-10
Payer: COMMERCIAL

## 2022-05-10 VITALS
HEIGHT: 68 IN | BODY MASS INDEX: 30.01 KG/M2 | WEIGHT: 198 LBS | DIASTOLIC BLOOD PRESSURE: 80 MMHG | SYSTOLIC BLOOD PRESSURE: 120 MMHG | HEART RATE: 80 BPM | OXYGEN SATURATION: 98 %

## 2022-05-10 DIAGNOSIS — Z86.16 PERSONAL HISTORY OF COVID-19: ICD-10-CM

## 2022-05-10 DIAGNOSIS — Z23 ENCOUNTER FOR IMMUNIZATION: ICD-10-CM

## 2022-05-10 PROCEDURE — 99395 PREV VISIT EST AGE 18-39: CPT | Mod: 25

## 2022-05-10 PROCEDURE — 90715 TDAP VACCINE 7 YRS/> IM: CPT

## 2022-05-10 PROCEDURE — 90471 IMMUNIZATION ADMIN: CPT

## 2022-05-10 RX ORDER — CITALOPRAM HYDROBROMIDE 10 MG/1
10 TABLET, FILM COATED ORAL
Qty: 30 | Refills: 1 | Status: ACTIVE | COMMUNITY
Start: 2022-05-10 | End: 1900-01-01

## 2022-05-17 ENCOUNTER — NON-APPOINTMENT (OUTPATIENT)
Age: 37
End: 2022-05-17

## 2022-05-27 PROBLEM — Z86.16 HISTORY OF COVID-19: Status: ACTIVE | Noted: 2022-03-25

## 2022-05-27 NOTE — PHYSICAL EXAM
[No Acute Distress] : no acute distress [Well Nourished] : well nourished [Well Developed] : well developed [Well-Appearing] : well-appearing [Normal Sclera/Conjunctiva] : normal sclera/conjunctiva [PERRL] : pupils equal round and reactive to light [EOMI] : extraocular movements intact [Normal Outer Ear/Nose] : the outer ears and nose were normal in appearance [Normal Oropharynx] : the oropharynx was normal [Normal TMs] : both tympanic membranes were normal [Normal Nasal Mucosa] : the nasal mucosa was normal [No JVD] : no jugular venous distention [No Lymphadenopathy] : no lymphadenopathy [Supple] : supple [Thyroid Normal, No Nodules] : the thyroid was normal and there were no nodules present [No Respiratory Distress] : no respiratory distress  [No Accessory Muscle Use] : no accessory muscle use [Clear to Auscultation] : lungs were clear to auscultation bilaterally [Normal Percussion] : the chest was normal to percussion [Normal Rate] : normal rate  [Regular Rhythm] : with a regular rhythm [Normal S1, S2] : normal S1 and S2 [No Murmur] : no murmur heard [No Carotid Bruits] : no carotid bruits [No Abdominal Bruit] : a ~M bruit was not heard ~T in the abdomen [No Varicosities] : no varicosities [Pedal Pulses Present] : the pedal pulses are present [No Edema] : there was no peripheral edema [No Palpable Aorta] : no palpable aorta [No Extremity Clubbing/Cyanosis] : no extremity clubbing/cyanosis [Soft] : abdomen soft [Non-distended] : non-distended [No Masses] : no abdominal mass palpated [No HSM] : no HSM [Normal Bowel Sounds] : normal bowel sounds [Normal Supraclavicular Nodes] : no supraclavicular lymphadenopathy [Normal Posterior Cervical Nodes] : no posterior cervical lymphadenopathy [Normal Anterior Cervical Nodes] : no anterior cervical lymphadenopathy [No CVA Tenderness] : no CVA  tenderness [No Spinal Tenderness] : no spinal tenderness [No Joint Swelling] : no joint swelling [Grossly Normal Strength/Tone] : grossly normal strength/tone [No Rash] : no rash [No Skin Lesions] : no skin lesions [Coordination Grossly Intact] : coordination grossly intact [No Focal Deficits] : no focal deficits [Normal Gait] : normal gait [Deep Tendon Reflexes (DTR)] : deep tendon reflexes were 2+ and symmetric [Speech Grossly Normal] : speech grossly normal [Memory Grossly Normal] : memory grossly normal [Normal Affect] : the affect was normal [Normal Mood] : the mood was normal [Normal Insight/Judgement] : insight and judgment were intact [de-identified] : sl tenderness across lower abd, L > R  [de-identified] : no suspicious nevi

## 2022-05-27 NOTE — HISTORY OF PRESENT ILLNESS
[FreeTextEntry1] : Here for annual exam [de-identified] : Here for annual exam and to f/u on reflux, hx diverticulitis, IH s/p banding, overweight.  Has lost excellent weight; feels good, maintaining it, he says.  Has a child now, and wants to be as healthy as possible.  Has had diverticulitis at a young age, and tends to have many episodes of BRB.  With his BRB has a pain/?cramp focally just L of mid lower abd - feels this same area every time he goes through some hematochezia.   Was felt in 2013 to have possible diverticulitis/colitis, but CT did not reveal clear etiology of an episode of mucus/blood/pain at that time.  Has no emesis/n/v/anorexia/fever-chills associated with episodes.  Has been seeing a GI doc outside of system.  Had flex sig, and now colonoscopy w no other pathology noted.  \par \par Other update is he is s/p case of COVID with long recuperation period.  Now almost completely better, has some fatigue but windedness and cough has past.  s/p eval w [facility] program as well, to have pulmonary stress test to complete things.  PFTs were wnl.  Oxygenating normally.  \par \par Lastly discussed his spirits at length.  Has periods of much lower motivation, wife and daughter can find him distracted, 'hard to be happy'.  Discussed this in past.  Can't definitely place finger on a trigger, thinks he's overall ok, but his PHQ is 10, no SI.  \par

## 2022-05-27 NOTE — HEALTH RISK ASSESSMENT
[Very Good] : ~his/her~  mood as very good [Yes] : Yes [2 - 4 times a month (2 pts)] : 2-4 times a month (2 points) [1 or 2 (0 pts)] : 1 or 2 (0 points) [No] : In the past 12 months have you used drugs other than those required for medical reasons? No [No falls in past year] : Patient reported no falls in the past year [1] : 2) Feeling down, depressed, or hopeless for several days (1) [1/2 of Days or More (2)] : 6.) Feeling bad about yourself, or that you are a failure, or have let yourself or your family down? Half the days or more [Several Days (1)] : 7.) Trouble concentrating on things, such as reading a newspaper or watching television? Several days [Not at All (0)] : 8.) Moving or speaking so slowly that other people could have noticed, or the opposite, moving or speaking faster than usual? Not at all [Moderate] : severity of depression is moderate [Somewhat Difficult] : How difficult have these problems made it for you to do your work, take care of things at home, or get along with people? Somewhat difficult [Audit-CScore] : 2 [[id number]] : 2 [[id number]] : 10 [None] : None [With Family] : lives with family [Employed] : employed [College] : College [] :  [# Of Children ___] : has [unfilled] children [High Risk Behavior] : no high risk behavior [Feels Safe at Home] : Feels safe at home [Fully functional (bathing, dressing, toileting, transferring, walking, feeding)] : Fully functional (bathing, dressing, toileting, transferring, walking, feeding) [Fully functional (using the telephone, shopping, preparing meals, housekeeping, doing laundry, using] : Fully functional and needs no help or supervision to perform IADLs (using the telephone, shopping, preparing meals, housekeeping, doing laundry, using transportation, managing medications and managing finances) [Reports changes in hearing] : Reports no changes in hearing [Reports changes in vision] : Reports no changes in vision [Reports normal functional visual acuity (ie: able to read med bottle)] : Reports normal functional visual acuity [Reports changes in dental health] : Reports no changes in dental health [Smoke Detector] : smoke detector [Carbon Monoxide Detector] : carbon monoxide detector [Guns at Home] : no guns at home [Safety elements used in home] : safety elements used in home [Seat Belt] :  uses seat belt [Sunscreen] : uses sunscreen [Travel to Developing Areas] : does not  travel to developing areas [TB Exposure] : is not being exposed to tuberculosis [Caregiver Concerns] : does not have caregiver concerns

## 2022-05-27 NOTE — ASSESSMENT
[FreeTextEntry1] : 1) HCM - flu vacc encouraged annually, [place] w new child . TDAP - given . COVID vaccine discussed at length - he is still considering/hesitant, now s/p protracted case.  NICOLE, SB, SD, sunblock, exercise/wt loss strategy discussed.  Full labs for cpe utd w pulm work up. 2) depression - PHQ10, no SI/red flags.  Discussed at length.  He is interested/wants to proceed w SSRI.  CItalopram sent to pharmacy, low dose titration at first.  Can discuss progress in 4 weeks or so, and decide on further titration - given x 2 mos.

## 2023-03-07 ENCOUNTER — APPOINTMENT (OUTPATIENT)
Dept: INTERNAL MEDICINE | Facility: CLINIC | Age: 38
End: 2023-03-07
Payer: COMMERCIAL

## 2023-03-07 VITALS
HEIGHT: 68 IN | BODY MASS INDEX: 31.07 KG/M2 | SYSTOLIC BLOOD PRESSURE: 112 MMHG | OXYGEN SATURATION: 99 % | HEART RATE: 76 BPM | DIASTOLIC BLOOD PRESSURE: 64 MMHG | WEIGHT: 205 LBS

## 2023-03-07 DIAGNOSIS — K52.9 NONINFECTIVE GASTROENTERITIS AND COLITIS, UNSPECIFIED: ICD-10-CM

## 2023-03-07 DIAGNOSIS — K92.1 MELENA: ICD-10-CM

## 2023-03-07 PROCEDURE — 99214 OFFICE O/P EST MOD 30 MIN: CPT

## 2023-03-07 RX ORDER — OMEPRAZOLE 20 MG/1
20 CAPSULE, DELAYED RELEASE ORAL DAILY
Qty: 30 | Refills: 1 | Status: ACTIVE | COMMUNITY
Start: 2023-03-07 | End: 1900-01-01

## 2023-03-08 LAB
BASOPHILS # BLD AUTO: 0.06 K/UL
BASOPHILS NFR BLD AUTO: 0.7 %
EOSINOPHIL # BLD AUTO: 0.14 K/UL
EOSINOPHIL NFR BLD AUTO: 1.6 %
HCT VFR BLD CALC: 41.6 %
HGB BLD-MCNC: 14.4 G/DL
IMM GRANULOCYTES NFR BLD AUTO: 0.3 %
LYMPHOCYTES # BLD AUTO: 2.48 K/UL
LYMPHOCYTES NFR BLD AUTO: 28.8 %
MAN DIFF?: NORMAL
MCHC RBC-ENTMCNC: 31.1 PG
MCHC RBC-ENTMCNC: 34.6 GM/DL
MCV RBC AUTO: 89.8 FL
MONOCYTES # BLD AUTO: 0.54 K/UL
MONOCYTES NFR BLD AUTO: 6.3 %
NEUTROPHILS # BLD AUTO: 5.36 K/UL
NEUTROPHILS NFR BLD AUTO: 62.3 %
PLATELET # BLD AUTO: 217 K/UL
RBC # BLD: 4.63 M/UL
RBC # FLD: 13.5 %
WBC # FLD AUTO: 8.61 K/UL

## 2023-03-11 NOTE — HISTORY OF PRESENT ILLNESS
[FreeTextEntry8] : Here for recent difficulty with some GI symptoms again.  Was seeing some mucus in all his stools and got worried he might have a ?colitis.  Has had episodes of presumably infectious colitis, with left lower quadrant sensitivity whenever it acts up.  Hasn't had diverticulitis found, but does appear to have small diverticula on CT.  Went to Dr. Roberts/GI in past, needed IH banded, improved in terms of that.  Also w this episode of mucus in stool, has noticed some black stools, with not much by way of gastritic pain.  Has no n/v/anorexia.  Not dizzy, stools now back to more normal color.

## 2023-03-11 NOTE — PHYSICAL EXAM
[No Acute Distress] : no acute distress [Well Nourished] : well nourished [Well Developed] : well developed [Well-Appearing] : well-appearing [No Respiratory Distress] : no respiratory distress  [No Accessory Muscle Use] : no accessory muscle use [Clear to Auscultation] : lungs were clear to auscultation bilaterally [Normal Rate] : normal rate  [Regular Rhythm] : with a regular rhythm [Normal S1, S2] : normal S1 and S2 [No Carotid Bruits] : no carotid bruits [No Edema] : there was no peripheral edema [No Extremity Clubbing/Cyanosis] : no extremity clubbing/cyanosis [Soft] : abdomen soft [Non-distended] : non-distended [No Masses] : no abdominal mass palpated [No HSM] : no HSM [Normal Bowel Sounds] : normal bowel sounds [de-identified] : bright red at conj, no pallor [de-identified] : with deep palpation LLQ has some mild pain, no rebound, no guarding [de-identified] : no pallor at hands/nail beds noted

## 2023-03-11 NOTE — ASSESSMENT
[FreeTextEntry1] : 1) will get h/h to be sure hasn't bled.  Also w regard to darker stool, will get h.pylori stool done.  2) asking him to get back to GI office.  At this point, would have him do colonoscopy, need to be careful no mucosal colitis, etc - mucus would imply inflammation on mucosa.  Also would have him do EGD with the darker stools.  Holding off any eg, antibx; will give omeprazole 20 daily x 3-4 weeks after he gives in stool sample.

## 2023-04-22 ENCOUNTER — EMERGENCY (EMERGENCY)
Facility: HOSPITAL | Age: 38
LOS: 1 days | Discharge: HOME | End: 2023-04-22
Attending: EMERGENCY MEDICINE
Payer: COMMERCIAL

## 2023-04-22 VITALS
OXYGEN SATURATION: 98 % | DIASTOLIC BLOOD PRESSURE: 79 MMHG | WEIGHT: 199.96 LBS | HEART RATE: 74 BPM | TEMPERATURE: 98 F | SYSTOLIC BLOOD PRESSURE: 123 MMHG | RESPIRATION RATE: 20 BRPM | HEIGHT: 68 IN

## 2023-04-22 VITALS
OXYGEN SATURATION: 99 % | RESPIRATION RATE: 17 BRPM | TEMPERATURE: 98 F | DIASTOLIC BLOOD PRESSURE: 72 MMHG | HEART RATE: 61 BPM | SYSTOLIC BLOOD PRESSURE: 117 MMHG

## 2023-04-22 DIAGNOSIS — Z98.89 OTHER SPECIFIED POSTPROCEDURAL STATES: Chronic | ICD-10-CM

## 2023-04-22 DIAGNOSIS — Z98.890 OTHER SPECIFIED POSTPROCEDURAL STATES: Chronic | ICD-10-CM

## 2023-04-22 PROCEDURE — 99284 EMERGENCY DEPT VISIT MOD MDM: CPT | Mod: 25

## 2023-04-22 PROCEDURE — 90715 TDAP VACCINE 7 YRS/> IM: CPT

## 2023-04-22 PROCEDURE — 90471 IMMUNIZATION ADMIN: CPT

## 2023-04-22 PROCEDURE — 73110 X-RAY EXAM OF WRIST: CPT

## 2023-04-22 PROCEDURE — 73140 X-RAY EXAM OF FINGER(S): CPT

## 2023-04-22 PROCEDURE — 12001 RPR S/N/AX/GEN/TRNK 2.5CM/<: CPT

## 2023-04-22 PROCEDURE — 73130 X-RAY EXAM OF HAND: CPT

## 2023-04-22 PROCEDURE — 96372 THER/PROPH/DIAG INJ SC/IM: CPT | Mod: XU

## 2023-04-22 PROCEDURE — 11760 REPAIR OF NAIL BED: CPT

## 2023-04-22 PROCEDURE — 73120 X-RAY EXAM OF HAND: CPT

## 2023-04-22 PROCEDURE — 73130 X-RAY EXAM OF HAND: CPT | Mod: 26,RT

## 2023-04-22 PROCEDURE — 73110 X-RAY EXAM OF WRIST: CPT | Mod: 26,RT

## 2023-04-22 RX ORDER — LIDOCAINE HYDROCHLORIDE AND EPINEPHRINE 10; 10 MG/ML; UG/ML
10 INJECTION, SOLUTION INFILTRATION; PERINEURAL ONCE
Refills: 0 | Status: DISCONTINUED | OUTPATIENT
Start: 2023-04-22 | End: 2023-04-22

## 2023-04-22 RX ORDER — LIDOCAINE HCL 20 MG/ML
10 VIAL (ML) INJECTION ONCE
Refills: 0 | Status: COMPLETED | OUTPATIENT
Start: 2023-04-22 | End: 2023-04-22

## 2023-04-22 RX ORDER — ACETAMINOPHEN 500 MG
975 TABLET ORAL ONCE
Refills: 0 | Status: COMPLETED | OUTPATIENT
Start: 2023-04-22 | End: 2023-04-22

## 2023-04-22 RX ORDER — KETOROLAC TROMETHAMINE 30 MG/ML
30 SYRINGE (ML) INJECTION ONCE
Refills: 0 | Status: DISCONTINUED | OUTPATIENT
Start: 2023-04-22 | End: 2023-04-22

## 2023-04-22 RX ORDER — TETANUS TOXOID, REDUCED DIPHTHERIA TOXOID AND ACELLULAR PERTUSSIS VACCINE, ADSORBED 5; 2.5; 8; 8; 2.5 [IU]/.5ML; [IU]/.5ML; UG/.5ML; UG/.5ML; UG/.5ML
0.5 SUSPENSION INTRAMUSCULAR ONCE
Refills: 0 | Status: COMPLETED | OUTPATIENT
Start: 2023-04-22 | End: 2023-04-22

## 2023-04-22 RX ADMIN — TETANUS TOXOID, REDUCED DIPHTHERIA TOXOID AND ACELLULAR PERTUSSIS VACCINE, ADSORBED 0.5 MILLILITER(S): 5; 2.5; 8; 8; 2.5 SUSPENSION INTRAMUSCULAR at 09:53

## 2023-04-22 RX ADMIN — Medication 1 TABLET(S): at 13:12

## 2023-04-22 RX ADMIN — Medication 30 MILLIGRAM(S): at 09:51

## 2023-04-22 RX ADMIN — Medication 975 MILLIGRAM(S): at 09:51

## 2023-04-22 RX ADMIN — Medication 30 MILLIGRAM(S): at 10:30

## 2023-04-22 RX ADMIN — Medication 10 MILLILITER(S): at 13:03

## 2023-04-22 RX ADMIN — Medication 975 MILLIGRAM(S): at 10:30

## 2023-04-22 NOTE — ED ADULT NURSE NOTE - OBJECTIVE STATEMENT
36 yo male presents to ED c/o R 4th digit injury s/p "dropping transmission on my hand." 36 yo male presents to ED c/o R 4th digit injury s/p "dropping transmission on my hand." Pt endorsing pain and "some tingling" to R 4th digit and R wrist pain. Ecchymosis and swelling noted to R 4th finger and partial avulsion to fingernail, bleeding controlled. Pt a&ox3, breathing spontaneous and unlabored, sensation intact, skin warm to touch, + peripheral pulses noted, able to move fingers. Pt safety measures in place and comfort provided. 38 yo male presents to ED c/o R 4th digit injury s/p "dropping transmission on my hand." Pt endorsing pain and "some tingling" to R 4th digit, sensation otherwise intact. Pt also endorsing R wrist pain. Ecchymosis and swelling noted to R 4th finger and partial avulsion to fingernail, bleeding controlled. Pt a&ox3, breathing spontaneous and unlabored, sensation intact, skin warm to touch, + peripheral pulses noted, able to move fingers. Pt safety measures in place and comfort provided.

## 2023-04-22 NOTE — ED PROVIDER NOTE - PATIENT PORTAL LINK FT
You can access the FollowMyHealth Patient Portal offered by [facility] by registering at the following website: [email or web address]. By joining [facility]’s FollowMyHealth portal, you will also be able to view your health information using other applications (apps) compatible with our system.

## 2023-04-22 NOTE — ED PROVIDER NOTE - ADDITIONAL NOTES AND INSTRUCTIONS:
do not return to vigourous work with your hands until you are evaluated by the hand surgeon. keep your hands clean and dry.

## 2023-04-22 NOTE — ED PROVIDER NOTE - CARE PLAN
1 Principal Discharge DX:	Nailbed laceration, finger   Principal Discharge DX:	Nailbed laceration, finger, initial encounter  Goal:	right 4th finger

## 2023-04-22 NOTE — ED PROVIDER NOTE - CARE PROVIDER_API CALL
Afia Haley)  Plastic Surgery; Surgery  [address], [address]  [place] [address]  Phone: [phone]  Fax: [phone]  Follow Up Time: 4-6 Days

## 2023-04-22 NOTE — ED PROVIDER NOTE - PHYSICAL EXAMINATION
General: non-toxic, NAD  HEENT: NCAT, PERRL, oropharyngeal AW patent  Cardiac: RRR, no murmurs, 2+ peripheral pulses  Resp: CTAB, normal rate  Abdomen: soft, non-distended, bowel sounds present, no ttp, no rebound or guarding. no organomegaly  Extremities: Right fourth digit with broken nail just distal to the cuticle in the midline with underlying ecchymosis.  Patient able to oppose all 5 digits no pain along flexor tendon.  Tenderness no bony tenderness over the bones of the wrist or forearm,  However does have pain on passive extension of the wrist and fingers.  No other ecchymoses.   skin: Aside from wound, warm and dry no rashes  Neuro: AAOx4, 5+motor, sensation grossly intact  Psych: mood and affect appropriate

## 2023-04-22 NOTE — ED PROVIDER NOTE - ATTENDING CONTRIBUTION TO CARE
Attending MD Durand:  I personally have seen and examined this patient. I have performed a substantive portion of the visit including all aspects of the medical decision making.  Resident note reviewed and agree on plan of care and except where noted.      37-year-old gentleman presenting with crush injury to the right fourth finger.  The patient dropped a transmission on the finger just prior to arrival.  He notes numbness and tingling on both sides of the fingertip.  There was bleeding but that is controlled now.  Unknown last tetanus.    Examination reveals swelling and subungual hematoma to the proximal third of the nailbed, partial avulsion of the nail from the eponychial fold.  Sensation is grossly intact on both sides of the finger, may be slightly hypoesthetic.  FDS and FDP testing intact.  Full extension of the finger.  Remainder of hand nontender.  Normal affect.    X-rays performed of the finger and no fractures of the distal phalanx identified.  Plan for avulsion of remainder of fingernail to explore for possible nailbed laceration, and then reassessment.        *The above represents an initial assessment/impression. Please refer to progress notes for potential changes in patient clinical course* Attending MD Durand:  I personally have seen and examined this patient. I have performed a substantive portion of the visit including all aspects of the medical decision making.  Resident note reviewed and agree on plan of care and except where noted.      37-year-old gentleman presenting with crush injury to the right fourth finger.  The patient dropped a transmission on the finger just prior to arrival.  He notes numbness and tingling on both sides of the fingertip.  There was bleeding but that is controlled now.  Unknown last tetanus.    Examination of right 4th digit reveals swelling and subungual hematoma to the proximal third of the nailbed, partial avulsion of the nail from the eponychial fold.  Sensation is grossly intact on both sides of the finger, may be slightly hypoesthetic.  FDS and FDP testing intact.  Full extension of the finger.  Remainder of hand nontender.  Normal affect.    X-rays performed of the finger and no fractures of the distal phalanx identified.  Plan for avulsion of remainder of fingernail to explore for possible nailbed laceration, and then reassessment.        *The above represents an initial assessment/impression. Please refer to progress notes for potential changes in patient clinical course*

## 2023-04-22 NOTE — ED PROVIDER NOTE - CLINICAL SUMMARY MEDICAL DECISION MAKING FREE TEXT BOX
Healthy male not on AC presents after mechanical injury to his right fourth digit with intact sensation pulses.  Will need nail to be removed as possible underlying nailbed laceration repair under digital block.  Rule out open fracture with x-rays. tetanus and hand surgery follow-up.

## 2023-04-22 NOTE — ED PROVIDER NOTE - OBJECTIVE STATEMENT
37-year-old male no reported medical history presents after dropping a car transmission on the distal portion of his right fourth digit.  No falls trauma LOC, neck or back pain.  Notes pain radiating up to the mid forearm and diminished sensation to the pulp of the right fourth digit.  Unknown last tetanus. 37-year-old right handed male no reported medical history presents after dropping a car transmission on the distal portion of his right fourth digit.  No falls trauma LOC, neck or back pain.  Notes pain radiating up to the mid forearm and diminished sensation to the pulp of the right fourth digit.  Unknown last tetanus.

## 2023-04-22 NOTE — ED PROVIDER NOTE - NSFOLLOWUPINSTRUCTIONS_ED_ALL_ED_FT
You were seen in the Emergency Department for damaged nailbed.     1) Advance activity as tolerated.   2) Continue all previously prescribed medications as directed.    3) Follow up with your primary care physician in 24-48 hours - take copies of your results.    4) Return to the Emergency Department for worsening or persistent symptoms, and/or ANY NEW OR CONCERNING SYMPTOMS.    Please follow up with hand surgery this week to monitor the condition of your injury.     if you develop fever chills, drainage of pus, tracking redness from the injury site up the arm, loss of sensation, or change in the color of the skin of your finger immediately seek medical attention or return to the ER.     Call the hand surgeon today to schedule an appointment.     take the entire course of antibiotics as prescribed; it was sent electronically to your pharmacy.     avoid soaking the finger in any dirty water or solution or any other exposure to dirty materials. you can shower and wash your hands with soap and water, but dry them thoroughly and do not keep the hand in gloves or any other moist environment.

## 2023-04-22 NOTE — ED PROCEDURE NOTE - PROCEDURE ADDITIONAL DETAILS
nail removed and cleansed in normal saline. reimplanted into nail fold with four point sutures applied to the lateral edges with 4-0 vicryl

## 2023-04-26 ENCOUNTER — NON-APPOINTMENT (OUTPATIENT)
Age: 38
End: 2023-04-26

## 2023-04-26 ENCOUNTER — APPOINTMENT (OUTPATIENT)
Dept: ORTHOPEDIC SURGERY | Facility: CLINIC | Age: 38
End: 2023-04-26
Payer: COMMERCIAL

## 2023-04-26 VITALS
DIASTOLIC BLOOD PRESSURE: 64 MMHG | WEIGHT: 205 LBS | BODY MASS INDEX: 31.07 KG/M2 | HEART RATE: 76 BPM | HEIGHT: 68 IN | SYSTOLIC BLOOD PRESSURE: 112 MMHG | OXYGEN SATURATION: 99 %

## 2023-04-26 PROCEDURE — 29130 APPL FINGER SPLINT STATIC: CPT | Mod: RT

## 2023-04-26 PROCEDURE — 99204 OFFICE O/P NEW MOD 45 MIN: CPT | Mod: 25

## 2023-05-10 ENCOUNTER — APPOINTMENT (OUTPATIENT)
Dept: ORTHOPEDIC SURGERY | Facility: CLINIC | Age: 38
End: 2023-05-10
Payer: COMMERCIAL

## 2023-05-10 DIAGNOSIS — S69.91XD UNSPECIFIED INJURY OF RIGHT WRIST, HAND AND FINGER(S), SUBSEQUENT ENCOUNTER: ICD-10-CM

## 2023-05-10 PROCEDURE — 99213 OFFICE O/P EST LOW 20 MIN: CPT | Mod: 25

## 2024-05-22 ENCOUNTER — EMERGENCY (EMERGENCY)
Facility: HOSPITAL | Age: 39
LOS: 1 days | Discharge: HOME | End: 2024-05-22
Attending: EMERGENCY MEDICINE
Payer: COMMERCIAL

## 2024-05-22 VITALS
RESPIRATION RATE: 17 BRPM | SYSTOLIC BLOOD PRESSURE: 103 MMHG | OXYGEN SATURATION: 99 % | HEART RATE: 71 BPM | TEMPERATURE: 99 F | DIASTOLIC BLOOD PRESSURE: 65 MMHG

## 2024-05-22 VITALS
SYSTOLIC BLOOD PRESSURE: 112 MMHG | HEART RATE: 65 BPM | DIASTOLIC BLOOD PRESSURE: 68 MMHG | RESPIRATION RATE: 18 BRPM | TEMPERATURE: 98 F | OXYGEN SATURATION: 96 %

## 2024-05-22 DIAGNOSIS — Z98.890 OTHER SPECIFIED POSTPROCEDURAL STATES: Chronic | ICD-10-CM

## 2024-05-22 DIAGNOSIS — Z98.89 OTHER SPECIFIED POSTPROCEDURAL STATES: Chronic | ICD-10-CM

## 2024-05-22 PROCEDURE — 99284 EMERGENCY DEPT VISIT MOD MDM: CPT

## 2024-05-22 PROCEDURE — 73630 X-RAY EXAM OF FOOT: CPT

## 2024-05-22 PROCEDURE — 73610 X-RAY EXAM OF ANKLE: CPT | Mod: 26,LT

## 2024-05-22 PROCEDURE — 73610 X-RAY EXAM OF ANKLE: CPT

## 2024-05-22 PROCEDURE — 73630 X-RAY EXAM OF FOOT: CPT | Mod: 26,LT

## 2024-05-22 PROCEDURE — 73590 X-RAY EXAM OF LOWER LEG: CPT

## 2024-05-22 PROCEDURE — 99284 EMERGENCY DEPT VISIT MOD MDM: CPT | Mod: 25

## 2024-05-22 PROCEDURE — 73590 X-RAY EXAM OF LOWER LEG: CPT | Mod: 26,LT

## 2024-05-22 RX ORDER — ACETAMINOPHEN 500 MG
650 TABLET ORAL ONCE
Refills: 0 | Status: COMPLETED | OUTPATIENT
Start: 2024-05-22 | End: 2024-05-22

## 2024-05-22 RX ADMIN — Medication 650 MILLIGRAM(S): at 21:17

## 2024-05-22 NOTE — ED PROVIDER NOTE - NSFOLLOWUPINSTRUCTIONS_ED_ALL_ED_FT
Ankle Sprain in Children    WHAT YOU NEED TO KNOW:    An ankle sprain happens when 1 or more ligaments in your child's ankle joint stretch or tear. Ligaments are tough tissues that connect bones. Ligaments support your child's joints and keep the bones in place. An ankle sprain is usually caused by a direct injury or sudden twisting of the joint. This may happen while playing sports, or may be due to a fall.     DISCHARGE INSTRUCTIONS:    Return to the emergency department if:     Your child has severe pain in his or her ankle.    Your child's foot or toes are cold or numb.    Your child's ankle becomes more weak or unstable (wobbly).    Your child cannot put any weight on the ankle or foot.    Your child's swelling has increased or returned.    Contact your child's healthcare provider if:     Your child's pain does not go away, even after treatment.    You have questions or concerns about your child's condition or care.    Medicines: Your child may need any of the following:     NSAIDs, such as ibuprofen, help decrease swelling, pain, and fever. This medicine is available with or without a doctor's order. NSAIDs can cause stomach bleeding or kidney problems in certain people. If your child takes blood thinner medicine, always ask if NSAIDs are safe for him. Always read the medicine label and follow directions. Do not give these medicines to children under 6 months of age without direction from your child's healthcare provider.    Acetaminophen decreases pain. It is available without a doctor's order. Ask how much to give your child and how often to give it. Follow directions. Acetaminophen can cause liver damage if not taken correctly.    Do not give aspirin to children under 18 years of age. Your child could develop Reye syndrome if he takes aspirin. Reye syndrome can cause life-threatening brain and liver damage. Check your child's medicine labels for aspirin, salicylates, or oil of wintergreen.     Give your child's medicine as directed. Contact your child's healthcare provider if you think the medicine is not working as expected. Tell him or her if your child is allergic to any medicine. Keep a current list of the medicines, vitamins, and herbs your child takes. Include the amounts, and when, how, and why they are taken. Bring the list or the medicines in their containers to follow-up visits. Carry your child's medicine list with you in case of an emergency.    Manage your child's ankle sprain:     Use support devices, such as a brace, cast, or splint, may be needed to limit your child's movement and protect the joint. Your child may need to use crutches to decrease pain as he or she moves around.     Help your child rest his ankle. Ask when your child can return to his or her usual activities or sports.     Apply ice on your child's ankle for 15 to 20 minutes every hour or as directed. Use an ice pack, or put crushed ice in a plastic bag. Cover it with a towel. Ice helps prevent tissue damage and decreases swelling and pain.    Compress your child's ankle. Ask if you should wrap an elastic bandage around your child's injured ligament. An elastic bandage provides support and helps decrease swelling and movement so the joint can heal. Wear as long as directed.    Elevate your child's ankle above the level of the heart as often as you can. This will help decrease swelling and pain. Prop your child's ankle on pillows or blankets to keep it elevated comfortably.      Go to physical therapy as directed.A physical therapist teaches your child exercises to help improve movement and strength, and to decrease pain.    Follow up with your child's healthcare provider as directed: Write down your questions so you remember to ask them during your child's visits. Ankle Sprain    WHAT YOU NEED TO KNOW:    An ankle sprain happens when 1 or more ligaments in your ankle joint stretch or tear. Ligaments are tough tissues that connect bones. Ligaments support your joints and keep the bones in place. An ankle sprain is usually caused by a direct injury or sudden twisting of the joint. This may happen while playing sports, or may be due to a fall.     DISCHARGE INSTRUCTIONS:    Return to the emergency department if:     You have severe pain your ankle.    Your foot or toes are cold or numb.    Your ankle becomes more weak or unstable (wobbly).    You cannot put any weight on the ankle or foot.    Your swelling has increased or returned.    NSAIDs, such as ibuprofen, help decrease swelling, pain, and fever. This medicine is available with or without a doctor's order. NSAIDs can cause stomach bleeding or kidney problems in certain people. If you take blood thinner medicine, always ask if NSAIDs are safe. Always read the medicine label and follow directions.   ------------    Use support devices, such as a brace, cast, or splint, may be needed to limit your movement and protect the joint. You may need to use crutches to decrease pain as you move around.       To reduce the pain and swelling:   -Please rest the affected extremity and avoid any activities that would further exacerbate your pain.   -Ice the affected extremity for no longer than 20 minutes at a time. Icing can be repeated every two hours for the next two days.   -Compression (ACE) wraps can be used to minimize swelling, but make sure it is not too tight as to not cut off your circulation.   -Keep the extremity elevated above the level of the heart to reduce swelling.

## 2024-05-22 NOTE — ED ADULT NURSE NOTE - NSFALLRISKFACTORS_ED_ALL_ED
[No studies available for review at this time.] : No studies available for review at this time. No indicators present

## 2024-05-22 NOTE — ED PROVIDER NOTE - CARE PROVIDER_API CALL
Romario Aleman  Orthopaedic Surgery  [address]  [place] [address]  Phone: [phone]  Fax: [phone]  Follow Up Time: 1-3 Days

## 2024-05-22 NOTE — ED ADULT NURSE NOTE - NSFALLRISKINTERV_ED_ALL_ED
Assistance OOB with selected safe patient handling equipment if applicable/Assistance with ambulation/Communicate fall risk and risk factors to all staff, patient, and family/Monitor gait and stability/Provide visual cue: yellow wristband, yellow gown, etc/Reinforce activity limits and safety measures with patient and family/Call bell, personal items and telephone in reach/Instruct patient to call for assistance before getting out of bed/chair/stretcher/Non-slip footwear applied when patient is off stretcher/[place] to call system/Physically safe environment - no spills, clutter or unnecessary equipment/Purposeful Proactive Rounding/Room/bathroom lighting operational, light cord in reach

## 2024-05-22 NOTE — ED PROVIDER NOTE - CLINICAL SUMMARY MEDICAL DECISION MAKING FREE TEXT BOX
(+) [place] ankle rule, concern for distal fib fx. Will obtain XR tib fib, ankle, and foot to r/o acute fx.    Tylenol PO given, ice packs given. (+) [place] ankle rule, concern for distal fib fx. Will obtain XR tib fib, ankle, and foot to r/o acute fx.    Tylenol PO given, ice packs given.      Attending note.  Inversion injury to left ankle.  X-ray of left ankle, foot, tib-fib to rule out fracture.  Analgesia.  Ice.

## 2024-05-22 NOTE — ED PROVIDER NOTE - PHYSICAL EXAMINATION
GEN: Pt in NAD, A&O x3. GCS 15  EYES: Sclera white w/o injection, PERRLA, EOMI.  ENT: Head NCAT. Nose without deformity, turbinates without edema or erythema, no DC. No auricular TTP. Mouth and pharynx without erythema or exudates, uvula midline, no tonsillar enlargement. Neck supple FROM.   RESP: No chest wall tenderness, CTA b/l, no wheezes, rales, or rhonchi.   CARDIAC: RRR, clear distinct S1, S2, no murmurs, gallops, or rubs.   ABD: Abdomen non-distended, soft, non-tender, no rebound or guarding. No CVAT b/l.  VASC: 2+ carotid, radial, and dorsalis pedis pulses b/l. No edema or tenderness of the lower extremities.  NEURO: CN 2-12 grossly intact. Normal and equal sensation UE, LE and face b/l. 5/5 strength UE and LE b/l.  Pronator drift negative. Normal gait and gross cerebellar functioning.  MSK: left ankle tender to palpation near lateral margin of lateral malleolus. Area of swelling to lateral region of ankle.   SKIN: No rashes noted. GEN: Pt in NAD, A&O x3. GCS 15  EYES: Sclera white w/o injection, PERRLA, EOMI.  ENT: Head NCAT. Nose without deformity, turbinates without edema or erythema, no DC. No auricular TTP. Mouth and pharynx without erythema or exudates, uvula midline, no tonsillar enlargement. Neck supple FROM.   RESP: No chest wall tenderness, CTA b/l, no wheezes, rales, or rhonchi.   CARDIAC: RRR, clear distinct S1, S2, no murmurs, gallops, or rubs.   ABD: Abdomen non-distended, soft, non-tender, no rebound or guarding. No CVAT b/l.  VASC: 2+ carotid, radial, and dorsalis pedis pulses b/l. No edema or tenderness of the lower extremities.  NEURO: CN 2-12 grossly intact. Normal and equal sensation UE, LE and face b/l. 5/5 strength UE and LE b/l.  Pronator drift negative. Normal gait and gross cerebellar functioning.  MSK: left ankle tender to palpation near lateral margin of lateral malleolus. Area of swelling to lateral region of ankle.   SKIN: No rashes noted.      Attending note.  Patient is alert in mild distress.  Examination of the left lower extremity reveals swelling over the lateral ankle.  Proximal leg is nontender.  Patient has mild tenderness with squeeze test.  Achilles is nontender.  There is no tenderness over the medial malleolus.  There is no tenderness over the deltoid.  He has tenderness over the lateral malleolus, ATFL and CFL ligaments.  There is no tenderness at the base of the fifth but tenderness of the distal fifth metatarsal.  There is no midfoot tenderness.  Sensation is intact and normal.  He has decreased range of motion of his ankle secondary to pain.  There is no ecchymosis or erythema.

## 2024-05-22 NOTE — ED PROVIDER NOTE - OBJECTIVE STATEMENT
38 year old man no pmh presents to ER s/p trip and fall. States his left foot caught in hole and has been unable to bear weight due to pain since. No headstrike, no LOC. Has taken motrin with no relief prompting him to come to ER for evaluation. Reports no other injuries 38 year old man no pmh presents to ER s/p trip and fall. States his left foot caught in hole and has been unable to bear weight due to pain since. No headstrike, no LOC. Has taken motrin with no relief prompting him to come to ER for evaluation. Reports no other injuries      Attending note.  Patient was seen in room #35 to the left.  Agree with above.  Patient complaining of left lateral ankle pain after stepping into a hole this afternoon.  He denies any other injuries.  He does not recall if he previously fractured that ankle.  Denies any numbness or paresthesia.  He is unable to weight-bear due to pain.  Patient took ibuprofen prior to arrival.

## 2024-05-22 NOTE — ED ADULT NURSE NOTE - OBJECTIVE STATEMENT
37 y/o male presents to the ED endorsing L ankle pain s/p mechanical trip and fall. A/Ox4. Ambulatory without assistive devices at baseline. PMH: Achilles tear. Patient endorses that he was at work, when he tripped in a hole with his L ankle and fell. Patient denies LOC and headstrike. Patient endorses being unable to bear weight on the L ankle. Patient endorses taking PO motrin at home, without relief. Patient denies numbness/tingling, nausea, vomiting, chest pain and dyspnea. Safety and comfort provided.

## 2024-05-22 NOTE — ED PROVIDER NOTE - PATIENT PORTAL LINK FT
You can access the FollowMyHealth Patient Portal offered by [facility] by registering at the following website: [email or web address]. By joining [email or web address]’s FollowMyHealth portal, you will also be able to view your health information using other applications (apps) compatible with our system.

## 2024-05-30 ENCOUNTER — APPOINTMENT (OUTPATIENT)
Dept: ORTHOPEDIC SURGERY | Facility: CLINIC | Age: 39
End: 2024-05-30
Payer: OTHER MISCELLANEOUS

## 2024-05-30 VITALS
WEIGHT: 205 LBS | BODY MASS INDEX: 31.07 KG/M2 | DIASTOLIC BLOOD PRESSURE: 72 MMHG | SYSTOLIC BLOOD PRESSURE: 110 MMHG | HEIGHT: 68 IN | HEART RATE: 72 BPM

## 2024-05-30 PROCEDURE — 99204 OFFICE O/P NEW MOD 45 MIN: CPT

## 2024-05-30 NOTE — HISTORY OF PRESENT ILLNESS
[de-identified] : 38 year old male, presents for evaluation of left ankle WC injury. DOI 5/22/24. Patient works as a  at [employer], he was walking in back lot, tripped on a stepoff and inverted his ankle. He was evaluated at [facility] 5/22/24 xrays were taken which were negative. He endorses lateral ankle pain and pain in 5th metatarsal. He was given ace wrap and aircast and has been walking on crutches.

## 2024-05-30 NOTE — DISCUSSION/SUMMARY
[de-identified] : Discussed findings of today's exam and possible causes of patient's pain.  Educated patient on their most probable diagnosis of acute left ankle sprain.  Reviewed possible courses of treatment, and we collaboratively decided best course of treatment at this time will include conservative management.  Patient has been using an Aircast as provided by the ER, he is advised is not a particularly functional brace, patient was transitioned into a lace up ankle brace to be worn during the day for support.  Patient will be started on a course of physical therapy to restore normal range of motion and strength as tolerated.  Patient started on a course of oral NSAIDs, prescription given for Naprosyn (We discussed all possible side effects of this medication).  Patient is unable to perform his work duties as a  at a car [employer] at this time, he is currently 100% temporarily disabled.  Patient will be reassessed in 2 weeks.  Patient and spouse appreciate and agree with current plan.  This note was generated using dragon medical dictation software.  A reasonable effort has been made for proofreading its contents, but typos may still remain.  If there are any questions or points of clarification needed please notify my office.

## 2024-05-30 NOTE — RETURN TO WORK/SCHOOL
[FreeTextEntry1] : Faizan was seen today for evaluation of left ankle orthopedic injury.  He is unable to perform his work duties at this time.  He is being started on a course of treatment.  He will be reassessed in 2 weeks, please excuse him from work during that timeframe.  This note expires on 6/13/2024. Should you have any questions please call the office at [phone] Thank you for your understanding.     Aman Guadarrama DO, ATC Primary Care Sports Medicine [facility] [place]

## 2024-05-30 NOTE — PHYSICAL EXAM
[de-identified] : Constitutional: Well-nourished, well-developed, No acute distress Respiratory:  Good respiratory effort, no SOB Lymphatic: No regional lymphadenopathy, no lymphedema Psychiatric: Pleasant and normal affect, alert and oriented x3 Musculoskeletal: normal except where as noted in regional exam  Left ankle: APPEARANCE: Moderate swelling, no marked deformities  or malalignment POSITIVE TENDERNESS: ATFL, CFL, Lateral ankle NONTENDER: medial malleolus, lateral malleolus, tibialis posterior tendon, achilles tendon, no marked thickening of tendon, PTFL, anterior tibiofibular ligament (high ankle), sinus tarsus, deltoid ligaments, 5th metatarsal.  RANGE OF MOTION: Mild limitation of PF and Inversion due to pain/swelling, NL DF and Eversion.  RESISTIVE TESTING: Mild pain with resisted eversion and DF.  painless resisted  plantar flexion, and inversion.  SPECIAL TESTS: + anterior drawer for pain without laxity, + talar tilt for pain without laxity, neg Kleiger's  [de-identified] : I reviewed, interpreted and clinically correlated the following outside imaging studies, In system x-rays, 3 views of the left ankle and foot, no acute fracture seen

## 2024-06-07 ENCOUNTER — NON-APPOINTMENT (OUTPATIENT)
Age: 39
End: 2024-06-07

## 2024-06-11 ENCOUNTER — OUTPATIENT (OUTPATIENT)
Dept: OUTPATIENT SERVICES | Facility: HOSPITAL | Age: 39
LOS: 1 days | End: 2024-06-11
Payer: COMMERCIAL

## 2024-06-11 ENCOUNTER — APPOINTMENT (OUTPATIENT)
Dept: INTERNAL MEDICINE | Facility: CLINIC | Age: 39
End: 2024-06-11
Payer: COMMERCIAL

## 2024-06-11 VITALS
WEIGHT: 209 LBS | HEIGHT: 68 IN | SYSTOLIC BLOOD PRESSURE: 104 MMHG | DIASTOLIC BLOOD PRESSURE: 70 MMHG | BODY MASS INDEX: 31.67 KG/M2 | HEART RATE: 84 BPM | OXYGEN SATURATION: 97 %

## 2024-06-11 DIAGNOSIS — I10 ESSENTIAL (PRIMARY) HYPERTENSION: ICD-10-CM

## 2024-06-11 DIAGNOSIS — Z98.89 OTHER SPECIFIED POSTPROCEDURAL STATES: Chronic | ICD-10-CM

## 2024-06-11 DIAGNOSIS — Z86.59 PERSONAL HISTORY OF OTHER MENTAL AND BEHAVIORAL DISORDERS: ICD-10-CM

## 2024-06-11 DIAGNOSIS — Z98.890 OTHER SPECIFIED POSTPROCEDURAL STATES: Chronic | ICD-10-CM

## 2024-06-11 DIAGNOSIS — Z00.00 ENCOUNTER FOR GENERAL ADULT MEDICAL EXAMINATION W/OUT ABNORMAL FINDINGS: ICD-10-CM

## 2024-06-11 DIAGNOSIS — R73.03 PREDIABETES.: ICD-10-CM

## 2024-06-11 DIAGNOSIS — L72.9 FOLLICULAR CYST OF THE SKIN AND SUBCUTANEOUS TISSUE, UNSPECIFIED: ICD-10-CM

## 2024-06-11 LAB — H PYLORI AG STL QL: NEGATIVE

## 2024-06-11 PROCEDURE — 99395 PREV VISIT EST AGE 18-39: CPT

## 2024-06-11 PROCEDURE — G0463: CPT

## 2024-06-13 ENCOUNTER — APPOINTMENT (OUTPATIENT)
Dept: ORTHOPEDIC SURGERY | Facility: CLINIC | Age: 39
End: 2024-06-13
Payer: OTHER MISCELLANEOUS

## 2024-06-13 VITALS
BODY MASS INDEX: 31.67 KG/M2 | DIASTOLIC BLOOD PRESSURE: 76 MMHG | WEIGHT: 209 LBS | HEART RATE: 61 BPM | HEIGHT: 68 IN | TEMPERATURE: 97.3 F | SYSTOLIC BLOOD PRESSURE: 117 MMHG

## 2024-06-13 DIAGNOSIS — S93.492A SPRAIN OF OTHER LIGAMENT OF LEFT ANKLE, INITIAL ENCOUNTER: ICD-10-CM

## 2024-06-13 PROBLEM — R73.03 PREDIABETES: Status: ACTIVE | Noted: 2024-06-11

## 2024-06-13 PROBLEM — L72.9 SCROTAL CYST: Status: ACTIVE | Noted: 2024-06-11

## 2024-06-13 PROBLEM — Z86.59 HISTORY OF DEPRESSION: Status: RESOLVED | Noted: 2022-05-10 | Resolved: 2024-06-13

## 2024-06-13 LAB
ALBUMIN SERPL ELPH-MCNC: 5 G/DL
ALP BLD-CCNC: 68 U/L
ALT SERPL-CCNC: 32 U/L
ANION GAP SERPL CALC-SCNC: 14 MMOL/L
AST SERPL-CCNC: 24 U/L
BILIRUB SERPL-MCNC: 0.6 MG/DL
BUN SERPL-MCNC: 14 MG/DL
CALCIUM SERPL-MCNC: 9.7 MG/DL
CHLORIDE SERPL-SCNC: 104 MMOL/L
CHOLEST SERPL-MCNC: 211 MG/DL
CO2 SERPL-SCNC: 22 MMOL/L
CREAT SERPL-MCNC: 0.77 MG/DL
EGFR: 117 ML/MIN/1.73M2
ESTIMATED AVERAGE GLUCOSE: 100 MG/DL
GLUCOSE SERPL-MCNC: 87 MG/DL
HBA1C MFR BLD HPLC: 5.1 %
HDLC SERPL-MCNC: 45 MG/DL
LDLC SERPL CALC-MCNC: 143 MG/DL
NONHDLC SERPL-MCNC: 166 MG/DL
POTASSIUM SERPL-SCNC: 4.3 MMOL/L
PROT SERPL-MCNC: 7.4 G/DL
SODIUM SERPL-SCNC: 140 MMOL/L
TRIGL SERPL-MCNC: 130 MG/DL

## 2024-06-13 PROCEDURE — 99213 OFFICE O/P EST LOW 20 MIN: CPT

## 2024-06-13 NOTE — HISTORY OF PRESENT ILLNESS
[FreeTextEntry1] : Mr. ROOSEVELT GUERRERO  is a 38 year old male who presents to the office for a follow-up visit.  He injured his ankle on 5/22/24 at work.  Since his last visit he will take aleve and use hia ankle brace.  He has not started PT yet.  He has just rested.  He went back to work yesterday light duty and he had pain at the end of the day.

## 2024-06-13 NOTE — PHYSICAL EXAM
[de-identified] : Constitutional: Well-nourished, well-developed, No acute distress Respiratory:  Good respiratory effort, no SOB Lymphatic: No regional lymphadenopathy, no lymphedema Psychiatric: Pleasant and normal affect, alert and oriented x3 Musculoskeletal: normal except where as noted in regional exam  Left ankle: APPEARANCE: mild swelling, no marked deformities  or malalignment POSITIVE TENDERNESS: ATFL NONTENDER: CFL, Lateral ankle, medial malleolus, lateral malleolus, tibialis posterior tendon, achilles tendon, no marked thickening of tendon, PTFL, anterior tibiofibular ligament (high ankle), sinus tarsus, deltoid ligaments, 5th metatarsal.  RANGE OF MOTION: Full with mild pain at end range of PF and Inversion, NL DF and Eversion.  RESISTIVE TESTING: Mild pain with resisted eversion and DF.  painless resisted  plantar flexion, and inversion.  SPECIAL TESTS: + anterior drawer for pain without laxity, + talar tilt for pain without laxity, neg Kleiger's

## 2024-06-13 NOTE — HEALTH RISK ASSESSMENT
[Very Good] : ~his/her~  mood as very good [1 or 2 (0 pts)] : 1 or 2 (0 points) [No falls in past year] : Patient reported no falls in the past year [1/2 of Days or More (2)] : 6.) Feeling bad about yourself, or that you are a failure, or have let yourself or your family down? Half the days or more [Several Days (1)] : 7.) Trouble concentrating on things, such as reading a newspaper or watching television? Several days [Not at All (0)] : 8.) Moving or speaking so slowly that other people could have noticed, or the opposite, moving or speaking faster than usual? Not at all [Moderate] : Severity of Depression is Moderate [Somewhat Difficult] : How difficult have these problems made it for you to do your work, take care of things at home, or get along with people? Somewhat difficult [[id number]] : 10 [None] : None [With Family] : lives with family [Employed] : employed [College] : College [] :  [# Of Children ___] : has [unfilled] children [Feels Safe at Home] : Feels safe at home [Fully functional (bathing, dressing, toileting, transferring, walking, feeding)] : Fully functional (bathing, dressing, toileting, transferring, walking, feeding) [Fully functional (using the telephone, shopping, preparing meals, housekeeping, doing laundry, using] : Fully functional and needs no help or supervision to perform IADLs (using the telephone, shopping, preparing meals, housekeeping, doing laundry, using transportation, managing medications and managing finances) [Reports normal functional visual acuity (ie: able to read med bottle)] : Reports normal functional visual acuity [Smoke Detector] : smoke detector [Carbon Monoxide Detector] : carbon monoxide detector [Safety elements used in home] : safety elements used in home [Seat Belt] :  uses seat belt [Sunscreen] : uses sunscreen [2 - 3 times a week (3 pts)] : 2 - 3  times a week (3 points) [Yes] : In the past 12 months have you used drugs other than those required for medical reasons? Yes [0] : 2) Feeling down, depressed, or hopeless: Not at all (0) [Audit-CScore] : 3 [de-identified] : occ cannabis  [[id number]] : 0 [Former] : Former [5-9] : 5-9 [< 15 Years] : < 15 Years [NO] : No [High Risk Behavior] : no high risk behavior [Reports changes in hearing] : Reports no changes in hearing [Reports changes in vision] : Reports no changes in vision [Reports changes in dental health] : Reports no changes in dental health [Travel to Developing Areas] : does not  travel to developing areas [TB Exposure] : is not being exposed to tuberculosis [Caregiver Concerns] : does not have caregiver concerns

## 2024-06-13 NOTE — PHYSICAL EXAM
[No Acute Distress] : no acute distress [Well Nourished] : well nourished [Well Developed] : well developed [Well-Appearing] : well-appearing [Normal Sclera/Conjunctiva] : normal sclera/conjunctiva [PERRL] : pupils equal round and reactive to light [EOMI] : extraocular movements intact [Normal Outer Ear/Nose] : the outer ears and nose were normal in appearance [Normal Oropharynx] : the oropharynx was normal [Normal TMs] : both tympanic membranes were normal [Normal Nasal Mucosa] : the nasal mucosa was normal [No JVD] : no jugular venous distention [No Lymphadenopathy] : no lymphadenopathy [Supple] : supple [Thyroid Normal, No Nodules] : the thyroid was normal and there were no nodules present [No Respiratory Distress] : no respiratory distress  [No Accessory Muscle Use] : no accessory muscle use [Clear to Auscultation] : lungs were clear to auscultation bilaterally [Normal Percussion] : the chest was normal to percussion [Normal Rate] : normal rate  [Regular Rhythm] : with a regular rhythm [Normal S1, S2] : normal S1 and S2 [No Murmur] : no murmur heard [No Carotid Bruits] : no carotid bruits [No Abdominal Bruit] : a ~M bruit was not heard ~T in the abdomen [No Varicosities] : no varicosities [Pedal Pulses Present] : the pedal pulses are present [No Edema] : there was no peripheral edema [No Palpable Aorta] : no palpable aorta [No Extremity Clubbing/Cyanosis] : no extremity clubbing/cyanosis [Soft] : abdomen soft [Non-distended] : non-distended [No Masses] : no abdominal mass palpated [No HSM] : no HSM [Normal Bowel Sounds] : normal bowel sounds [Normal Supraclavicular Nodes] : no supraclavicular lymphadenopathy [Normal Posterior Cervical Nodes] : no posterior cervical lymphadenopathy [Normal Anterior Cervical Nodes] : no anterior cervical lymphadenopathy [No CVA Tenderness] : no CVA  tenderness [No Spinal Tenderness] : no spinal tenderness [No Joint Swelling] : no joint swelling [Grossly Normal Strength/Tone] : grossly normal strength/tone [No Rash] : no rash [No Skin Lesions] : no skin lesions [Coordination Grossly Intact] : coordination grossly intact [No Focal Deficits] : no focal deficits [Normal Gait] : normal gait [Deep Tendon Reflexes (DTR)] : deep tendon reflexes were 2+ and symmetric [Speech Grossly Normal] : speech grossly normal [Memory Grossly Normal] : memory grossly normal [Normal Affect] : the affect was normal [Normal Mood] : the mood was normal [Normal Insight/Judgement] : insight and judgment were intact [Urethral Meatus] : meatus normal [Penis Abnormality] : normal circumcised penis [Epididymis] : the epididymides were normal [Testes Mass (___cm)] : there were no testicular masses [de-identified] : sl tenderness across lower abd, L > R  [FreeTextEntry1] : R inferior scrotal skin with 1 cm round red cyst, with majority hard purulent head noted.  [de-identified] : no suspicious nevi

## 2024-06-13 NOTE — HISTORY OF PRESENT ILLNESS
[FreeTextEntry1] : Here for annual exam [de-identified] : Here for annual exam and to f/u on reflux, hx diverticulitis, IH s/p banding, overweight.  Has lost excellent weight and kept it off; wants to be as healthy as possible now for wife/child.  Has had diverticulitis at a young age, but not active since last cpe.   Also had h.pylori treated since last cpe here.    Has remained subjectively well since long case of COVID 3 years back, feeling his breathing has been at baseline.  One other item - developed a circular swelling R scrotal skin he thought was an ingrown hair some time back.  Has lasted a long time, and has a white head to it; has not progressed with multiple months on and off of attempting soaks and epsom bath.  Asking if it should just be 'taken care of'.

## 2024-06-13 NOTE — ASSESSMENT
[FreeTextEntry1] : 1) HCM - flu vacc encouraged annually, [place] w new child . TDAP 2022 . COVID vaccines q fall discussed at length - he is still considering/hesitant, now s/p protracted case.  NICOLE, SB, SD, sunblock, exercise/wt loss strategy discussed.  Full labs ordered. 2) depression, mild, last cpe - resolved.  3) given plastics ref to consider scrotal cyst excision.  4) congratulated on weight loss; he and wife exercising regularly, feeling very good physically with it.

## 2024-06-13 NOTE — RETURN TO WORK/SCHOOL
[FreeTextEntry1] : Faizan was seen today for reevaluation of left ankle injury.  He is permitted to continue light work duty at this time and for the next 2 weeks.  He is cleared for full work duty without restrictions as of 7/1/2024. Should you have any questions please call the office at [phone] Thank you for your understanding.     Aman Guadarrama DO, ATC Primary Care Sports Medicine [facility] [place]

## 2024-06-18 DIAGNOSIS — Z00.00 ENCOUNTER FOR GENERAL ADULT MEDICAL EXAMINATION WITHOUT ABNORMAL FINDINGS: ICD-10-CM

## 2024-06-18 DIAGNOSIS — R73.03 PREDIABETES: ICD-10-CM

## 2024-06-18 DIAGNOSIS — L72.9 FOLLICULAR CYST OF THE SKIN AND SUBCUTANEOUS TISSUE, UNSPECIFIED: ICD-10-CM

## 2024-06-21 ENCOUNTER — EMERGENCY (EMERGENCY)
Facility: HOSPITAL | Age: 39
LOS: 1 days | Discharge: HOME | End: 2024-06-21
Attending: EMERGENCY MEDICINE
Payer: SELF-PAY

## 2024-06-21 VITALS
OXYGEN SATURATION: 99 % | TEMPERATURE: 98 F | DIASTOLIC BLOOD PRESSURE: 58 MMHG | HEART RATE: 66 BPM | RESPIRATION RATE: 18 BRPM | SYSTOLIC BLOOD PRESSURE: 100 MMHG

## 2024-06-21 VITALS
RESPIRATION RATE: 18 BRPM | HEIGHT: 68 IN | OXYGEN SATURATION: 95 % | DIASTOLIC BLOOD PRESSURE: 75 MMHG | TEMPERATURE: 98 F | WEIGHT: 205.03 LBS | SYSTOLIC BLOOD PRESSURE: 108 MMHG | HEART RATE: 86 BPM

## 2024-06-21 DIAGNOSIS — Z98.89 OTHER SPECIFIED POSTPROCEDURAL STATES: Chronic | ICD-10-CM

## 2024-06-21 DIAGNOSIS — Z98.890 OTHER SPECIFIED POSTPROCEDURAL STATES: Chronic | ICD-10-CM

## 2024-06-21 PROCEDURE — 73610 X-RAY EXAM OF ANKLE: CPT | Mod: 26,LT

## 2024-06-21 PROCEDURE — 73630 X-RAY EXAM OF FOOT: CPT | Mod: 26,LT

## 2024-06-21 PROCEDURE — 73630 X-RAY EXAM OF FOOT: CPT

## 2024-06-21 PROCEDURE — 73610 X-RAY EXAM OF ANKLE: CPT

## 2024-06-21 PROCEDURE — 73590 X-RAY EXAM OF LOWER LEG: CPT | Mod: 26,LT

## 2024-06-21 PROCEDURE — 73590 X-RAY EXAM OF LOWER LEG: CPT

## 2024-06-21 PROCEDURE — 99284 EMERGENCY DEPT VISIT MOD MDM: CPT

## 2024-06-21 PROCEDURE — 99284 EMERGENCY DEPT VISIT MOD MDM: CPT | Mod: 25

## 2024-06-21 RX ORDER — ACETAMINOPHEN 500 MG
975 TABLET ORAL ONCE
Refills: 0 | Status: COMPLETED | OUTPATIENT
Start: 2024-06-21 | End: 2024-06-21

## 2024-06-21 RX ORDER — IBUPROFEN 200 MG
400 TABLET ORAL ONCE
Refills: 0 | Status: COMPLETED | OUTPATIENT
Start: 2024-06-21 | End: 2024-06-21

## 2024-06-21 RX ADMIN — Medication 400 MILLIGRAM(S): at 16:58

## 2024-06-21 RX ADMIN — Medication 975 MILLIGRAM(S): at 16:41

## 2024-06-21 NOTE — ED PROVIDER NOTE - CLINICAL SUMMARY MEDICAL DECISION MAKING FREE TEXT BOX
38 M with no known chronic PMHx who presents after rolling his left ankle while walking today. He recently sprained his ankle and had been recovering well when this occurred. He says that he has had severe pain with ambulation and is only able to weakly limp after this.  Denies head strike or LOC. No preceding symptoms.    ROS negative except as noted above.    GEN: Awake, AOx3, NAD.  HEENT: NCAT  CARDIO: RRR.   RESP: Normal respiratory effort  ABD: Soft, NTND.   MSK: No obvious deformity or ROM deficit. 2+ pulses x4.  - L ankle with significant lateral malleolar edema but severe TTP b/l malleoli & calcaneus  ----- NO midfoot TTP or digital TTP  SKIN: Warm, dry. No rashes. Nail beds without cyanosis or clubbing.  NEURO: Moves all four extremities spontaneously  PSYCH: Appropriate mood & affect.      MDM  38M presents with traumatic L ankle pain and exam c/f b/l malleolar TTP & calcaneal TTP. Otherwise well-appearing. Will assess for fx then splint / consult PRN.  - XRs, Pain control 38 M with no known chronic PMHx who presents after rolling his left ankle while walking today. He recently sprained his ankle and had been recovering well when this occurred. He says that he has had severe pain with ambulation and is only able to weakly limp after this.  Denies head strike or LOC. No preceding symptoms.    ROS negative except as noted above.    GEN: Awake, AOx3, NAD.  HEENT: NCAT  CARDIO: RRR.   RESP: Normal respiratory effort  ABD: Soft, NTND.   MSK: No obvious deformity or ROM deficit. 2+ pulses x4.  - L ankle with significant lateral malleolar edema but severe TTP b/l malleoli & calcaneus  ----- NO midfoot TTP or digital TTP  SKIN: Warm, dry. No rashes. Nail beds without cyanosis or clubbing.  NEURO: Moves all four extremities spontaneously  PSYCH: Appropriate mood & affect.    MDM  38M presents with traumatic L ankle pain and exam c/f b/l malleolar TTP & calcaneal TTP. Otherwise well-appearing. Will assess for fx then splint / consult PRN.  - XRs, Pain control

## 2024-06-21 NOTE — ED PROCEDURE NOTE - ATTENDING CONTRIBUTION TO CARE
I, EM Attending, Bill Adams was present for and supervised the critical portions of the procedure performed by the Resident/Fellow Physician or HOLGER.

## 2024-06-21 NOTE — ED PROVIDER NOTE - NSFOLLOWUPINSTRUCTIONS_ED_ALL_ED_FT
You are seen in the emergency department after rolling her ankle.  We obtained x-rays to assess for possible fracture, which were negative.  Sometimes there can still be an underlying fracture that does not show up on initial x-rays so we applied a splint and recommend "weight-bearing as tolerated" and follow-up with your outpatient primary care provider as well as our sports medicine clinic.    You should return to the emergency department if you develop any numbness/tingling, coolness of your foot, inability to walk, or any other new or concerning symptoms you like to have evaluated.

## 2024-06-21 NOTE — ED PROVIDER NOTE - PATIENT PORTAL LINK FT
You can access the FollowMyHealth Patient Portal offered by [facility] by registering at the following website: [email or web address]. By joining [employer]’s FollowMyHealth portal, you will also be able to view your health information using other applications (apps) compatible with our system.

## 2024-06-21 NOTE — ED PROVIDER NOTE - ATTENDING CONTRIBUTION TO CARE
I, Bill Adams MD, Emergency Medicine Attending Physician, personally saw and examined the patient and I personally made/approve the management plan and take responsibility for the patient management.    MDM: 38-year-old male who is otherwise healthy who presents with left ankle injury.  Patient with recent left ankle sprain, but reinjured it today while he was at work, inverted his ankle.  Patient states pain is primarily at the lateral aspect of the ankle, where there is significant swelling.    ROS: denies fevers, chills, numbness, weakness, or any other areas of pain.     On examination, patient with stable vitals, well-appearing, in no acute distress. Cardiac examination RRR, lungs CTAB.  Abdomen is soft and nontender.  MSK examination of the left lower extremity with tenderness over the lateral malleolus and over the ATFL/CFL/PTFL.  Minimal tenderness medially, but there is no tenderness over calcaneus, midfoot or forefoot.  Skin examination with no skin break.  Neurovascularly intact distally with 5/5 strength, normal sensation, equal pulses and brisk capillary refill, soft compartments.    Will obtain x-ray imaging of the left foot,  ankle, tib-fib .  Pain control and reassess.    My independent interpretation of the left foot, ankle, tib-fib x-ray images shows no acute fracture or dislocation.   More details to be seen in the official radiologist read.    Patient was placed in Aircast.  Patient states he already has made follow-up with his sports medicine physician who saw him for his prior ankle injury, Dr. Aman Son.  At 1825, patient reassessed and reports improved symptoms. Repeat MSK examination shows that the Aircast splint is in place. Neurovascularly intact distally with 5/5 strength, normal sensation, equal pulses and brisk capillary refill, soft compartments. Patient has demonstrated safe ambulatory in the ED with crutches and being WBAT on the affected extremity.  Patient was given instructions for splint care, warning signs to look out for to return to the ED, and verbalized understanding of this.  Stable for discharge with close follow-up and strict return precautions. Discussed the indications and side-effects of applicable medications.  Informed patient of all diagnostic test results including imaging. The patient has been informed of all concerning signs and symptoms to return to Emergency Department, the necessity to follow up with PMD within 2-3 days and orthopedics/sports medicine within 1 week was explained, or to return to the ED if unable to follow-up appropriately, and the patient reports understanding of above with capacity and insight.

## 2024-06-21 NOTE — ED ADULT NURSE NOTE - NSFALLRISKINTERV_ED_ALL_ED

## 2024-06-21 NOTE — ED PROVIDER NOTE - NSFOLLOWUPCLINICS_GEN_ALL_ED_FT
[facility] Sports Medicine  Sports Medicine  [address] [place] [address]  Phone: [phone]  Fax:

## 2024-06-21 NOTE — ED ADULT NURSE NOTE - OBJECTIVE STATEMENT
39yo M aaox4 denies PMHx, presents ambulatory to Ed from home, as per pt today at work twisted his L ankle, causing a L ankle swelling/pain, denies fall, no AC , denies cp, sob Safety and comfort measures initiated- bed placed in lowest position and side rails raised.

## 2024-06-27 ENCOUNTER — APPOINTMENT (OUTPATIENT)
Dept: ORTHOPEDIC SURGERY | Facility: CLINIC | Age: 39
End: 2024-06-27
Payer: OTHER MISCELLANEOUS

## 2024-06-27 VITALS
HEART RATE: 73 BPM | SYSTOLIC BLOOD PRESSURE: 125 MMHG | HEIGHT: 68 IN | DIASTOLIC BLOOD PRESSURE: 85 MMHG | WEIGHT: 209 LBS | BODY MASS INDEX: 31.67 KG/M2

## 2024-06-27 DIAGNOSIS — S93.492D SPRAIN OF OTHER LIGAMENT OF LEFT ANKLE, SUBSEQUENT ENCOUNTER: ICD-10-CM

## 2024-06-27 PROCEDURE — 99213 OFFICE O/P EST LOW 20 MIN: CPT

## 2024-07-02 ENCOUNTER — APPOINTMENT (OUTPATIENT)
Dept: PLASTIC SURGERY | Facility: CLINIC | Age: 39
End: 2024-07-02
Payer: COMMERCIAL

## 2024-07-02 PROCEDURE — 99203 OFFICE O/P NEW LOW 30 MIN: CPT

## 2024-07-18 ENCOUNTER — APPOINTMENT (OUTPATIENT)
Dept: ORTHOPEDIC SURGERY | Facility: CLINIC | Age: 39
End: 2024-07-18

## 2024-07-23 ENCOUNTER — LABORATORY RESULT (OUTPATIENT)
Age: 39
End: 2024-07-23

## 2024-07-23 ENCOUNTER — APPOINTMENT (OUTPATIENT)
Dept: PLASTIC SURGERY | Facility: CLINIC | Age: 39
End: 2024-07-23
Payer: COMMERCIAL

## 2024-07-23 PROCEDURE — 11422 EXC H-F-NK-SP B9+MARG 1.1-2: CPT

## 2024-07-23 PROCEDURE — 13131 CMPLX RPR F/C/C/M/N/AX/G/H/F: CPT

## 2024-08-01 ENCOUNTER — APPOINTMENT (OUTPATIENT)
Dept: PLASTIC SURGERY | Facility: CLINIC | Age: 39
End: 2024-08-01
Payer: COMMERCIAL

## 2024-08-01 ENCOUNTER — APPOINTMENT (OUTPATIENT)
Dept: ORTHOPEDIC SURGERY | Facility: CLINIC | Age: 39
End: 2024-08-01
Payer: OTHER MISCELLANEOUS

## 2024-08-01 VITALS
DIASTOLIC BLOOD PRESSURE: 72 MMHG | SYSTOLIC BLOOD PRESSURE: 110 MMHG | WEIGHT: 205 LBS | HEART RATE: 56 BPM | BODY MASS INDEX: 31.07 KG/M2 | HEIGHT: 68 IN

## 2024-08-01 DIAGNOSIS — S93.492D SPRAIN OF OTHER LIGAMENT OF LEFT ANKLE, SUBSEQUENT ENCOUNTER: ICD-10-CM

## 2024-08-01 DIAGNOSIS — L72.9 FOLLICULAR CYST OF THE SKIN AND SUBCUTANEOUS TISSUE, UNSPECIFIED: ICD-10-CM

## 2024-08-01 PROCEDURE — 99213 OFFICE O/P EST LOW 20 MIN: CPT

## 2024-08-01 PROCEDURE — 99024 POSTOP FOLLOW-UP VISIT: CPT

## 2024-08-01 NOTE — HISTORY OF PRESENT ILLNESS
[FreeTextEntry1] : S/P excision and bx of scrotal cyst 7-23-24  No excessive bleeding. No fevers. No odor. No purulent discharge. No excessive pain. Path c/w calcinosis cutis

## 2024-08-05 NOTE — DISCUSSION/SUMMARY
[de-identified] : Patient was seen today for reevaluation of his left ankle sprain.  Patient has interval improvement in his condition.  He has been able to return to full work duty, but still have some mild intermittent pain about the lateral ankle.  He has some slight tenderness at the ATFL, but no significant loss of range of motion, strength or function.  Patient can continue his full work duty without restrictions at this time.  Recommend continued PT and transition to home exercise program.  Follow up as needed.  Patient appreciates and agrees with current plan.  This note was generated using dragon medical dictation software.  A reasonable effort has been made for proofreading its contents, but typos may still remain.  If there are any questions or points of clarification needed please notify my office.

## 2024-08-05 NOTE — DISCUSSION/SUMMARY
[de-identified] : Patient was seen today for reevaluation of his left ankle sprain.  Patient has interval improvement in his condition.  He has been able to return to full work duty, but still have some mild intermittent pain about the lateral ankle.  He has some slight tenderness at the ATFL, but no significant loss of range of motion, strength or function.  Patient can continue his full work duty without restrictions at this time.  Recommend continued PT and transition to home exercise program.  Follow up as needed.  Patient appreciates and agrees with current plan.  This note was generated using dragon medical dictation software.  A reasonable effort has been made for proofreading its contents, but typos may still remain.  If there are any questions or points of clarification needed please notify my office.

## 2024-08-05 NOTE — PHYSICAL EXAM
[de-identified] : Constitutional: Well-nourished, well-developed, No acute distress Respiratory:  Good respiratory effort, no SOB Lymphatic: No regional lymphadenopathy, no lymphedema Psychiatric: Pleasant and normal affect, alert and oriented x3 Musculoskeletal: normal except where as noted in regional exam  Left ankle: APPEARANCE: minimal lateral ankle swelling overlying ATFL, no marked deformities  or malalignment POSITIVE TENDERNESS: minimal at ATFL NONTENDER: CFL, Lateral ankle, medial malleolus, lateral malleolus, tibialis posterior tendon, achilles tendon, no marked thickening of tendon, PTFL, anterior tibiofibular ligament (high ankle), sinus tarsus, deltoid ligaments, 5th metatarsal.  RANGE OF MOTION: Full with mild pain at end range of Inversion, NL PF, DF and Eversion.  RESISTIVE TESTING: Mild pain with resisted eversion and DF.  painless resisted  plantar flexion, and inversion.  SPECIAL TESTS: neg anterior drawer, neg talar tilt, neg Kleiger's

## 2024-08-05 NOTE — PHYSICAL EXAM
[de-identified] : Constitutional: Well-nourished, well-developed, No acute distress Respiratory:  Good respiratory effort, no SOB Lymphatic: No regional lymphadenopathy, no lymphedema Psychiatric: Pleasant and normal affect, alert and oriented x3 Musculoskeletal: normal except where as noted in regional exam  Left ankle: APPEARANCE: minimal lateral ankle swelling overlying ATFL, no marked deformities  or malalignment POSITIVE TENDERNESS: minimal at ATFL NONTENDER: CFL, Lateral ankle, medial malleolus, lateral malleolus, tibialis posterior tendon, achilles tendon, no marked thickening of tendon, PTFL, anterior tibiofibular ligament (high ankle), sinus tarsus, deltoid ligaments, 5th metatarsal.  RANGE OF MOTION: Full with mild pain at end range of Inversion, NL PF, DF and Eversion.  RESISTIVE TESTING: Mild pain with resisted eversion and DF.  painless resisted  plantar flexion, and inversion.  SPECIAL TESTS: neg anterior drawer, neg talar tilt, neg Kleiger's

## 2024-08-05 NOTE — DISCUSSION/SUMMARY
[de-identified] : Patient was seen today for reevaluation of his left ankle sprain.  Patient has interval improvement in his condition.  He has been able to return to full work duty, but still have some mild intermittent pain about the lateral ankle.  He has some slight tenderness at the ATFL, but no significant loss of range of motion, strength or function.  Patient can continue his full work duty without restrictions at this time.  Recommend continued PT and transition to home exercise program.  Follow up as needed.  Patient appreciates and agrees with current plan.  This note was generated using dragon medical dictation software.  A reasonable effort has been made for proofreading its contents, but typos may still remain.  If there are any questions or points of clarification needed please notify my office.

## 2024-08-05 NOTE — PHYSICAL EXAM
[de-identified] : Constitutional: Well-nourished, well-developed, No acute distress Respiratory:  Good respiratory effort, no SOB Lymphatic: No regional lymphadenopathy, no lymphedema Psychiatric: Pleasant and normal affect, alert and oriented x3 Musculoskeletal: normal except where as noted in regional exam  Left ankle: APPEARANCE: minimal lateral ankle swelling overlying ATFL, no marked deformities  or malalignment POSITIVE TENDERNESS: minimal at ATFL NONTENDER: CFL, Lateral ankle, medial malleolus, lateral malleolus, tibialis posterior tendon, achilles tendon, no marked thickening of tendon, PTFL, anterior tibiofibular ligament (high ankle), sinus tarsus, deltoid ligaments, 5th metatarsal.  RANGE OF MOTION: Full with mild pain at end range of Inversion, NL PF, DF and Eversion.  RESISTIVE TESTING: Mild pain with resisted eversion and DF.  painless resisted  plantar flexion, and inversion.  SPECIAL TESTS: neg anterior drawer, neg talar tilt, neg Kleiger's

## 2024-08-05 NOTE — DISCUSSION/SUMMARY
[de-identified] : Patient was seen today for reevaluation of his left ankle sprain.  Patient has interval improvement in his condition.  He has been able to return to full work duty, but still have some mild intermittent pain about the lateral ankle.  He has some slight tenderness at the ATFL, but no significant loss of range of motion, strength or function.  Patient can continue his full work duty without restrictions at this time.  Recommend continued PT and transition to home exercise program.  Follow up as needed.  Patient appreciates and agrees with current plan.  This note was generated using dragon medical dictation software.  A reasonable effort has been made for proofreading its contents, but typos may still remain.  If there are any questions or points of clarification needed please notify my office.

## 2024-08-05 NOTE — PHYSICAL EXAM
[de-identified] : Constitutional: Well-nourished, well-developed, No acute distress Respiratory:  Good respiratory effort, no SOB Lymphatic: No regional lymphadenopathy, no lymphedema Psychiatric: Pleasant and normal affect, alert and oriented x3 Musculoskeletal: normal except where as noted in regional exam  Left ankle: APPEARANCE: minimal lateral ankle swelling overlying ATFL, no marked deformities  or malalignment POSITIVE TENDERNESS: minimal at ATFL NONTENDER: CFL, Lateral ankle, medial malleolus, lateral malleolus, tibialis posterior tendon, achilles tendon, no marked thickening of tendon, PTFL, anterior tibiofibular ligament (high ankle), sinus tarsus, deltoid ligaments, 5th metatarsal.  RANGE OF MOTION: Full with mild pain at end range of Inversion, NL PF, DF and Eversion.  RESISTIVE TESTING: Mild pain with resisted eversion and DF.  painless resisted  plantar flexion, and inversion.  SPECIAL TESTS: neg anterior drawer, neg talar tilt, neg Kleiger's

## 2024-08-06 ENCOUNTER — NON-APPOINTMENT (OUTPATIENT)
Age: 39
End: 2024-08-06

## 2024-09-11 NOTE — REVIEW OF SYSTEMS
[Follow-Up: _____] : a [unfilled] follow-up visit [Spouse] : spouse [Fever] : no fever [Chills] : no chills [Fatigue] : fatigue [Night Sweats] : no night sweats [Negative] : Gastrointestinal [FreeTextEntry6] : see hpi

## 2024-09-24 ENCOUNTER — INPATIENT (INPATIENT)
Facility: HOSPITAL | Age: 39
LOS: 0 days | Discharge: HOME | DRG: 313 | End: 2024-09-25
Attending: HOSPITALIST | Admitting: HOSPITALIST
Payer: COMMERCIAL

## 2024-09-24 VITALS
HEART RATE: 71 BPM | SYSTOLIC BLOOD PRESSURE: 85 MMHG | OXYGEN SATURATION: 98 % | WEIGHT: 210.1 LBS | TEMPERATURE: 98 F | DIASTOLIC BLOOD PRESSURE: 71 MMHG | RESPIRATION RATE: 15 BRPM | HEIGHT: 68 IN

## 2024-09-24 DIAGNOSIS — Z98.89 OTHER SPECIFIED POSTPROCEDURAL STATES: Chronic | ICD-10-CM

## 2024-09-24 DIAGNOSIS — Z98.890 OTHER SPECIFIED POSTPROCEDURAL STATES: Chronic | ICD-10-CM

## 2024-09-24 LAB
ALBUMIN SERPL ELPH-MCNC: 4.8 G/DL — SIGNIFICANT CHANGE UP (ref 3.3–5)
ALP SERPL-CCNC: 81 U/L — SIGNIFICANT CHANGE UP (ref 40–120)
ALT FLD-CCNC: 95 U/L — HIGH (ref 10–45)
ANION GAP SERPL CALC-SCNC: 15 MMOL/L — SIGNIFICANT CHANGE UP (ref 5–17)
AST SERPL-CCNC: 96 U/L — HIGH (ref 10–40)
BASOPHILS # BLD AUTO: 0.05 K/UL — SIGNIFICANT CHANGE UP (ref 0–0.2)
BASOPHILS NFR BLD AUTO: 0.5 % — SIGNIFICANT CHANGE UP (ref 0–2)
BILIRUB SERPL-MCNC: 0.9 MG/DL — SIGNIFICANT CHANGE UP (ref 0.2–1.2)
BUN SERPL-MCNC: 15 MG/DL — SIGNIFICANT CHANGE UP (ref 7–23)
CALCIUM SERPL-MCNC: 9.5 MG/DL — SIGNIFICANT CHANGE UP (ref 8.4–10.5)
CHLORIDE SERPL-SCNC: 102 MMOL/L — SIGNIFICANT CHANGE UP (ref 96–108)
CO2 SERPL-SCNC: 20 MMOL/L — LOW (ref 22–31)
CREAT SERPL-MCNC: 0.74 MG/DL — SIGNIFICANT CHANGE UP (ref 0.5–1.3)
EGFR: 118 ML/MIN/1.73M2 — SIGNIFICANT CHANGE UP
EOSINOPHIL # BLD AUTO: 0.17 K/UL — SIGNIFICANT CHANGE UP (ref 0–0.5)
EOSINOPHIL NFR BLD AUTO: 1.6 % — SIGNIFICANT CHANGE UP (ref 0–6)
FLUAV AG NPH QL: SIGNIFICANT CHANGE UP
FLUBV AG NPH QL: SIGNIFICANT CHANGE UP
GLUCOSE SERPL-MCNC: 105 MG/DL — HIGH (ref 70–99)
HCT VFR BLD CALC: 42.6 % — SIGNIFICANT CHANGE UP (ref 39–50)
HGB BLD-MCNC: 14.6 G/DL — SIGNIFICANT CHANGE UP (ref 13–17)
IMM GRANULOCYTES NFR BLD AUTO: 0.4 % — SIGNIFICANT CHANGE UP (ref 0–0.9)
LIDOCAIN IGE QN: 31 U/L — SIGNIFICANT CHANGE UP (ref 7–60)
LYMPHOCYTES # BLD AUTO: 1.04 K/UL — SIGNIFICANT CHANGE UP (ref 1–3.3)
LYMPHOCYTES # BLD AUTO: 9.9 % — LOW (ref 13–44)
MCHC RBC-ENTMCNC: 30.4 PG — SIGNIFICANT CHANGE UP (ref 27–34)
MCHC RBC-ENTMCNC: 34.3 GM/DL — SIGNIFICANT CHANGE UP (ref 32–36)
MCV RBC AUTO: 88.8 FL — SIGNIFICANT CHANGE UP (ref 80–100)
MONOCYTES # BLD AUTO: 0.7 K/UL — SIGNIFICANT CHANGE UP (ref 0–0.9)
MONOCYTES NFR BLD AUTO: 6.7 % — SIGNIFICANT CHANGE UP (ref 2–14)
NEUTROPHILS # BLD AUTO: 8.49 K/UL — HIGH (ref 1.8–7.4)
NEUTROPHILS NFR BLD AUTO: 80.9 % — HIGH (ref 43–77)
NRBC # BLD: 0 /100 WBCS — SIGNIFICANT CHANGE UP (ref 0–0)
NT-PROBNP SERPL-SCNC: <36 PG/ML — SIGNIFICANT CHANGE UP (ref 0–300)
PLATELET # BLD AUTO: 204 K/UL — SIGNIFICANT CHANGE UP (ref 150–400)
POTASSIUM SERPL-MCNC: 4 MMOL/L — SIGNIFICANT CHANGE UP (ref 3.5–5.3)
POTASSIUM SERPL-SCNC: 4 MMOL/L — SIGNIFICANT CHANGE UP (ref 3.5–5.3)
PROT SERPL-MCNC: 7.6 G/DL — SIGNIFICANT CHANGE UP (ref 6–8.3)
RBC # BLD: 4.8 M/UL — SIGNIFICANT CHANGE UP (ref 4.2–5.8)
RBC # FLD: 12.6 % — SIGNIFICANT CHANGE UP (ref 10.3–14.5)
RSV RNA NPH QL NAA+NON-PROBE: SIGNIFICANT CHANGE UP
SARS-COV-2 RNA SPEC QL NAA+PROBE: SIGNIFICANT CHANGE UP
SODIUM SERPL-SCNC: 137 MMOL/L — SIGNIFICANT CHANGE UP (ref 135–145)
TROPONIN T, HIGH SENSITIVITY RESULT: <6 NG/L — SIGNIFICANT CHANGE UP (ref 0–51)
WBC # BLD: 10.49 K/UL — SIGNIFICANT CHANGE UP (ref 3.8–10.5)
WBC # FLD AUTO: 10.49 K/UL — SIGNIFICANT CHANGE UP (ref 3.8–10.5)

## 2024-09-24 PROCEDURE — 71046 X-RAY EXAM CHEST 2 VIEWS: CPT | Mod: 26

## 2024-09-24 PROCEDURE — 99285 EMERGENCY DEPT VISIT HI MDM: CPT

## 2024-09-24 RX ORDER — ASPIRIN 325 MG
324 TABLET ORAL ONCE
Refills: 0 | Status: COMPLETED | OUTPATIENT
Start: 2024-09-24 | End: 2024-09-24

## 2024-09-24 RX ADMIN — Medication 324 MILLIGRAM(S): at 21:52

## 2024-09-24 NOTE — ED PROVIDER NOTE - NSTIMEPROVIDERCAREINITIATE_GEN_ER
24-Sep-2024 20:27 Size Of Lesion In Cm: 0 Lab Facility: [id number] Wound Care: Petrolatum Cryotherapy Text: The wound bed was treated with cryotherapy after the biopsy was performed. Destruction After The Procedure: No Anesthesia Type: 1% lidocaine with epinephrine Electrodesiccation Text: The wound bed was treated with electrodesiccation after the biopsy was performed. Depth Of Biopsy: dermis Notification Instructions: Patient will be notified of biopsy results. However, patient instructed to call the office if not contacted within 2 weeks. Biopsy Type: H and E Consent: Written consent was obtained and risks were reviewed including but not limited to scarring, infection, bleeding, scabbing, incomplete removal, nerve damage and allergy to anesthesia. Electrodesiccation And Curettage Text: The wound bed was treated with electrodesiccation and curettage after the biopsy was performed. Type Of Destruction Used: Curettage Anesthesia Volume In Cc (Will Not Render If 0): 1 Dressing: bandage Accession #: [id number] Bandage Applied: Yes Hemostasis: Aluminum Chloride Silver Nitrate Text: The wound bed was treated with silver nitrate after the biopsy was performed. Lab: [id number] Billing Type: Third-Party Bill Curettage Text: The wound bed was treated with curettage after the biopsy was performed. Biopsy Method: Personna blade Detail Level: Detailed Post-Care Instructions: I reviewed with the patient in detail post-care instructions. Patient is to keep the biopsy site dry overnight, and then apply bacitracin twice daily until healed. Patient may apply hydrogen peroxide soaks to remove any crusting.

## 2024-09-24 NOTE — ED PROVIDER NOTE - RAPID ASSESSMENT
38-year-old male PMH of daily marijuana use otherwise no significant past medical history, social EtOH use on the weekends, no cigarette smoking or cocaine/stimulant use, +family history of CAD less than 65 years old presents to the ED complaining of sudden onset chest pain left lower sternal border since 1 PM this afternoon sudden onset no radiation to the neck of the back.  Patient denies neurologic phenomena.  Patient reports nausea without vomiting, reports feeling tactile fever and chills.  Patient denies other recent URI symptoms, cough, hemoptysis lower extremity pain or edema, history of DVT or PE personal or family, recent prolonged immobilization or surgery, hormone use. No ASA taken today.  [phone]  Patient was seen as a rapid assessment (QPA) patient. This is an incomplete workup and it is intended that the patient will be seen in the main emergency department. The patient will be seen and further worked up in the main emergency department and their care will be completed by the main emergency department team along with a thorough physical exam. Receiving team will follow up on labs, analgesia, any clinical imaging, reassess and disposition as clinically indicated, all decisions regarding the progression of care will be made at their discretion. - Gagan Elkins PA-C

## 2024-09-24 NOTE — ED PROVIDER NOTE - CLINICAL SUMMARY MEDICAL DECISION MAKING FREE TEXT BOX
Attending MD Durand:  38-year-old with no known medical history is presenting for evaluation of left-sided chest discomfort.  The pain is in the anterior chest pressure-like, states that the pain is worse with sitting up.  He does admit to associated shortness of breath.  He states that the chest discomfort is somewhat worse with exertion as well.  No nausea vomiting or diaphoresis.  Denies any known cardiac history.  Patient still has pain at this time rates it about a 5/10.    Vital signs are nonactionable.  He sitting in the stretcher in no apparent distress.  Chest wall nontender clear lungs anteriorly regular heart sounds peripheral pulses full and equal to bilateral upper and lower extremities.    ECG recorded at 4:48 PM independently interpreted by me , Dr Lobito Durand,  at 9:45 PM shows normal sinus rhythm normal axis normal intervals T wave inversions lead III lead aVF no ST elevation or ST depression, no priors for comparison    Patient is presenting for evaluation of acute chest pain, overall fairly atypical for cardiac ischemia by description however shortness of breath is somewhat concerning, EKG does show inferior T wave inversions, no priors for comparison.  Patient had initial troponin in triage which was undetectable.  Will repeat 3-hour troponin.  Given abnormal cardiogram, will need to consider further ischemic workup with stress testing.  Clinical presentation is not consistent with pulmonary embolism or dissection.              *The above represents an initial assessment/impression. Please refer to progress notes for potential changes in patient clinical course*

## 2024-09-24 NOTE — ED ADULT NURSE NOTE - NSFALLUNIVINTERV_ED_ALL_ED
Bed/Stretcher in lowest position, wheels locked, appropriate side rails in place/Call bell, personal items and telephone in reach/Instruct patient to call for assistance before getting out of bed/chair/stretcher/Non-slip footwear applied when patient is off stretcher/[place] to call system/Physically safe environment - no spills, clutter or unnecessary equipment/Purposeful proactive rounding/Room/bathroom lighting operational, light cord in reach

## 2024-09-24 NOTE — ED PROVIDER NOTE - OBJECTIVE STATEMENT
37 y/o male, hx of marijuana use, denies pmh, presents to the ER for chest pain. states he was at work this morning and started to develop chest pain. states he works as a . states at around 12pm started to develop this pain. states located to left lower sternal border. states was sudden in onset. states felt very pale when episode started. states has been constant. admits to associated SOB. + family hx of CAD. patient denies f/n/v/d, HA, dizziness, abdominal pain, urinary symptoms, LOC, leg pain/swelling, recent travel.

## 2024-09-24 NOTE — ED PROVIDER NOTE - ATTENDING APP SHARED VISIT CONTRIBUTION OF CARE
Attending MD Durand: I personally made/approved the management plan and take responsibility for the patient management.

## 2024-09-24 NOTE — ED ADULT NURSE NOTE - OBJECTIVE STATEMENT
Pt is a 38 y.o male c.o CP. PT is A&OX4 resting in stretcher. PT endorses CP that began spontaneously around 1pm today while at work. PT states the pain started in is midsternal chest and radiated to his RT side. PT states the pain was spontaneous and tight in sensation. Additionally pt states he feels SOB upon exertion. PT has no known PMHx. Spontaneously breathing on RA>95%, CM NSR, abdomen soft nontender, peripheral pulses present, skin dry and intact. PT denies any fever, N/V/D, cough, recent traumatic injury, numbness, tingling, or known sick contacts. Safety and comfort measures initiated- bed placed in lowest position and side rails raised. Pt is a 38 y.o male c.o CP. PT is A&OX4 resting in stretcher. PT endorses CP that began spontaneously around 1pm today while at work. PT states the pain started in is midsternal chest and radiated to his LT side. PT states the pain was spontaneous and tight in sensation. Additionally pt states he feels SOB upon exertion. PT has no known PMHx. Spontaneously breathing on RA>95%, CM NSR, abdomen soft nontender, peripheral pulses present, skin dry and intact. PT denies any fever, N/V/D, cough, recent traumatic injury, numbness, tingling, or known sick contacts. Safety and comfort measures initiated- bed placed in lowest position and side rails raised.

## 2024-09-25 ENCOUNTER — TRANSCRIPTION ENCOUNTER (OUTPATIENT)
Age: 39
End: 2024-09-25

## 2024-09-25 ENCOUNTER — RESULT REVIEW (OUTPATIENT)
Age: 39
End: 2024-09-25

## 2024-09-25 VITALS
SYSTOLIC BLOOD PRESSURE: 113 MMHG | OXYGEN SATURATION: 98 % | HEART RATE: 66 BPM | TEMPERATURE: 99 F | DIASTOLIC BLOOD PRESSURE: 66 MMHG | RESPIRATION RATE: 18 BRPM

## 2024-09-25 DIAGNOSIS — R07.89 OTHER CHEST PAIN: ICD-10-CM

## 2024-09-25 DIAGNOSIS — R74.01 ELEVATION OF LEVELS OF LIVER TRANSAMINASE LEVELS: ICD-10-CM

## 2024-09-25 DIAGNOSIS — R07.9 CHEST PAIN, UNSPECIFIED: ICD-10-CM

## 2024-09-25 DIAGNOSIS — Z29.9 ENCOUNTER FOR PROPHYLACTIC MEASURES, UNSPECIFIED: ICD-10-CM

## 2024-09-25 DIAGNOSIS — K58.9 IRRITABLE BOWEL SYNDROME, UNSPECIFIED: ICD-10-CM

## 2024-09-25 LAB — TROPONIN T, HIGH SENSITIVITY RESULT: <6 NG/L — SIGNIFICANT CHANGE UP (ref 0–51)

## 2024-09-25 PROCEDURE — 76376 3D RENDER W/INTRP POSTPROCES: CPT | Mod: 26

## 2024-09-25 PROCEDURE — 93306 TTE W/DOPPLER COMPLETE: CPT | Mod: 26

## 2024-09-25 PROCEDURE — 99285 EMERGENCY DEPT VISIT HI MDM: CPT

## 2024-09-25 PROCEDURE — 83880 ASSAY OF NATRIURETIC PEPTIDE: CPT

## 2024-09-25 PROCEDURE — 75574 CT ANGIO HRT W/3D IMAGE: CPT | Mod: 26

## 2024-09-25 PROCEDURE — 71046 X-RAY EXAM CHEST 2 VIEWS: CPT

## 2024-09-25 PROCEDURE — 80053 COMPREHEN METABOLIC PANEL: CPT

## 2024-09-25 PROCEDURE — 76376 3D RENDER W/INTRP POSTPROCES: CPT

## 2024-09-25 PROCEDURE — 75574 CT ANGIO HRT W/3D IMAGE: CPT | Mod: MC

## 2024-09-25 PROCEDURE — 93306 TTE W/DOPPLER COMPLETE: CPT

## 2024-09-25 PROCEDURE — 93356 MYOCRD STRAIN IMG SPCKL TRCK: CPT

## 2024-09-25 PROCEDURE — 84484 ASSAY OF TROPONIN QUANT: CPT

## 2024-09-25 PROCEDURE — 99236 HOSP IP/OBS SAME DATE HI 85: CPT

## 2024-09-25 PROCEDURE — 85025 COMPLETE CBC W/AUTO DIFF WBC: CPT

## 2024-09-25 PROCEDURE — 83690 ASSAY OF LIPASE: CPT

## 2024-09-25 PROCEDURE — 87637 SARSCOV2&INF A&B&RSV AMP PRB: CPT

## 2024-09-25 RX ORDER — MAG HYDROX/ALUMINUM HYD/SIMETH 200-200-20
30 SUSPENSION, ORAL (FINAL DOSE FORM) ORAL EVERY 4 HOURS
Refills: 0 | Status: DISCONTINUED | OUTPATIENT
Start: 2024-09-25 | End: 2024-09-25

## 2024-09-25 RX ORDER — 5-HYDROXYTRYPTOPHAN (5-HTP) 100 MG
3 TABLET,DISINTEGRATING ORAL AT BEDTIME
Refills: 0 | Status: DISCONTINUED | OUTPATIENT
Start: 2024-09-25 | End: 2024-09-25

## 2024-09-25 RX ORDER — MAG HYDROX/ALUMINUM HYD/SIMETH 200-200-20
30 SUSPENSION, ORAL (FINAL DOSE FORM) ORAL
Qty: 5400 | Refills: 0
Start: 2024-09-25 | End: 2024-10-24

## 2024-09-25 RX ORDER — ACETAMINOPHEN 325 MG
650 TABLET ORAL EVERY 6 HOURS
Refills: 0 | Status: DISCONTINUED | OUTPATIENT
Start: 2024-09-25 | End: 2024-09-25

## 2024-09-25 NOTE — DISCHARGE NOTE NURSING/CASE MANAGEMENT/SOCIAL WORK - NSDCVIVACCINE_GEN_ALL_CORE_FT
Tdap; 22-Apr-2023 09:53; Ashley Ramirez (RN); [employer]; [id number]   (Exp. Date: 22-Feb-2025); IntraMuscular; Deltoid Left.; 0.5 milliLiter(s); VIS (VIS Published: 09-May-2013, VIS Presented: 22-Apr-2023);

## 2024-09-25 NOTE — H&P ADULT - NSHPREVIEWOFSYSTEMS_GEN_ALL_CORE
Review of Systems:   CONSTITUTIONAL: No fever, weight loss  EYES: No eye pain, visual disturbances, or discharge  ENMT:  No difficulty hearing, tinnitus, vertigo; No sinus or throat pain  RESPIRATORY: No SOB. No cough, wheezing, chills or hemoptysis  CARDIOVASCULAR: No  palpitations, dizziness, or leg swelling  GASTROINTESTINAL: No abdominal or epigastric pain. No nausea, vomiting, or hematemesis; No diarrhea or constipation. No melena or hematochezia.  GENITOURINARY: No dysuria, frequency, hematuria, or incontinence  NEUROLOGICAL: No headaches, memory loss, loss of strength, numbness, or tremors  ENDOCRINE: No heat or cold intolerance; No hair loss  MUSCULOSKELETAL: No joint pain or swelling; No muscle, back pain  PSYCHIATRIC: No depression, anxiety, mood swings, or difficulty sleeping  HEME/LYMPH: No easy bruising, or bleeding gums

## 2024-09-25 NOTE — ED ADULT NURSE REASSESSMENT NOTE - NS ED NURSE REASSESS COMMENT FT1
Received report from Stan ALY. Patient resting comfortably in stretcher. A&Ox4. Patient in no acute distress. Patient pending inpatient telemetry bed assignment. Denies chest pain, SOB, headache, N/V/D, abdominal pain, fever/chills currently. Plan of care discussed. Safety and comfort measures maintained.

## 2024-09-25 NOTE — H&P ADULT - HISTORY OF PRESENT ILLNESS
38-year-old male PMH of daily marijuana use otherwise no significant past medical history, social EtOH use on the weekends, no cigarette smoking or cocaine/stimulant use, +family history of CAD less than 65 years old presents to the ED complaining of sudden onset chest pain left lower sternal border since 1 PM yesterday afternoon sudden onset no radiation to the neck of the back.  Patient denies neurologic phenomena.  Patient reports nausea without vomiting, reports feeling tactile fever and chills.  Patient denies other recent URI symptoms, cough, hemoptysis lower extremity pain or edema, history of DVT or PE personal or family, recent prolonged immobilization or surgery, hormone use.  Patient says he was at work yesterday morning and started to develop chest pain. states he works as a . at around 12pm started to develop this pain, located to left lower sternal border. was sudden in onset. felt very pale when episode started.  has been constant since then, At that time was 7/10, now decreased to 2/10 with  SOB.    In ED, trops negative and EKG showed some T wave inversions, patient admitted for further w/u

## 2024-09-25 NOTE — DISCHARGE NOTE PROVIDER - HOSPITAL COURSE
HPI:  38-year-old male PMH of daily marijuana use otherwise no significant past medical history, social EtOH use on the weekends, no cigarette smoking or cocaine/stimulant use, +family history of CAD less than 65 years old presents to the ED complaining of sudden onset chest pain left lower sternal border since 1 PM yesterday afternoon sudden onset no radiation to the neck of the back.  Patient denies neurologic phenomena.  Patient reports nausea without vomiting, reports feeling tactile fever and chills.  Patient denies other recent URI symptoms, cough, hemoptysis lower extremity pain or edema, history of DVT or PE personal or family, recent prolonged immobilization or surgery, hormone use.  Patient says he was at work yesterday morning and started to develop chest pain. states he works as a . at around 12pm started to develop this pain, located to left lower sternal border. was sudden in onset. felt very pale when episode started.  has been constant since then, At that time was 7/10, now decreased to 2/10 with  SOB.    In ED, trops negative and EKG showed some T wave inversions, patient admitted for further w/u (25 Sep 2024 11:10)    Hospital Course:  - CP: EKG w/ inferior TWIs, Trops Negative, CTA Coronaries: Negative, TTE: Normal       Important Medication Changes and Reason:  none    Active or Pending Issues Requiring Follow-up:  Follow up with PCP for chest pain and follow up     Advanced Directives:   [ x] Full code  [ ] DNR  [ ] Hospice    Discharge Diagnoses:  Chest pain          HPI:  38-year-old male PMH of daily marijuana use otherwise no significant past medical history, social EtOH use on the weekends, no cigarette smoking or cocaine/stimulant use, +family history of CAD less than 65 years old presents to the ED complaining of sudden onset chest pain left lower sternal border since 1 PM yesterday afternoon sudden onset no radiation to the neck of the back.  Patient denies neurologic phenomena.  Patient reports nausea without vomiting, reports feeling tactile fever and chills.  Patient denies other recent URI symptoms, cough, hemoptysis lower extremity pain or edema, history of DVT or PE personal or family, recent prolonged immobilization or surgery, hormone use.  Patient says he was at work yesterday morning and started to develop chest pain. states he works as a . at around 12pm started to develop this pain, located to left lower sternal border. was sudden in onset. felt very pale when episode started.  has been constant since then, At that time was 7/10, now decreased to 2/10 with  SOB.    In ED, trops negative and EKG showed some T wave inversions, patient admitted for further w/u (25 Sep 2024 11:10)    Hospital Course:  - CP: EKG w/ inferior TWIs, Trops Negative, CTA Coronaries: Negative, TTE: Normal  patient was cleared by cardiology for Discharge  outpatient f/u with cardiology and pcp       Important Medication Changes and Reason:  none    Active or Pending Issues Requiring Follow-up:  Follow up with PCP for chest pain and follow up     Advanced Directives:   [ x] Full code  [ ] DNR  [ ] Hospice    Discharge Diagnoses:  Chest pain

## 2024-09-25 NOTE — DISCHARGE NOTE NURSING/CASE MANAGEMENT/SOCIAL WORK - NSDCPEFALRISK_GEN_ALL_CORE
For information on Fall & Injury Prevention, visit: [email or web address] OR  [email or web address] OR  https://www.cdc.gov/steadi/patient.html

## 2024-09-25 NOTE — H&P ADULT - ASSESSMENT
1 38-year-old male PMH of daily marijuana use otherwise no significant past medical history, social EtOH use on the weekends, no cigarette smoking or cocaine/stimulant use, +family history of CAD less than 65 years old presents to the ED complaining of sudden onset chest pain left lower sternal border since 1 PM yesterday afternoon admitted for further w/u

## 2024-09-25 NOTE — DISCHARGE NOTE PROVIDER - NSDCFUADDAPPT_GEN_ALL_CORE_FT
APPTS ARE READY TO BE MADE: [x] YES    Best Family or Patient Contact (if needed):    Additional Information about above appointments (if needed):    1:   2:   3:     Other comments or requests:    APPTS ARE READY TO BE MADE: [x] YES    Best Family or Patient Contact (if needed):    Additional Information about above appointments (if needed):    1: cardiology  2: pcp  3:     Other comments or requests:    APPTS ARE READY TO BE MADE: [x] YES    Best Family or Patient Contact (if needed):    Additional Information about above appointments (if needed):    1: cardiology  2: pcp  3:     Other comments or requests:   Appointment was scheduled in [facility] on 10/2 at 9am  Patient also stated they already had a cardio appt

## 2024-09-25 NOTE — H&P ADULT - PROBLEM SELECTOR PLAN 2
Mild transaminitis   does not c/o any abdominal pain currently with no abdominal findings on exam   will CTM   hepatitis panel for sake of completion   If continue to trend up can consider doing US abdomen

## 2024-09-25 NOTE — H&P ADULT - PROBLEM SELECTOR PLAN 1
p/w atypical chest pain started since yesterday, constant and not relieved with anything   positive CAD family history   EKG showed some T wave inversions   Trops negative   will order TTE   Consulted cardiology waiting for recs   lipid panel  A1c   NPO until further cardiology recs p/w atypical chest pain started since yesterday, constant and not relieved with anything associated w/ sob   positive CAD family history, denies h/o smoking   EKG showed some T wave inversions ( T wave inversions lead III ,aVF , no priors for comparison)   Trops negative   will order TTE   Consulted cardiology waiting for recs   lipid panel  A1c   NPO until further cardiology recs

## 2024-09-25 NOTE — DISCHARGE NOTE NURSING/CASE MANAGEMENT/SOCIAL WORK - PATIENT PORTAL LINK FT
You can access the FollowMyHealth Patient Portal offered by [facility] by registering at the following website: [email or web address]. By joining [facility]’s FollowMyHealth portal, you will also be able to view your health information using other applications (apps) compatible with our system.

## 2024-09-25 NOTE — DISCHARGE NOTE PROVIDER - CARE PROVIDER_API CALL
Joey Laurent  Internal Medicine  [address] [address]  Phone: [phone]  Fax: [phone]  Follow Up Time: 1 week

## 2024-09-25 NOTE — CONSULT NOTE ADULT - SUBJECTIVE AND OBJECTIVE BOX
DATE OF SERVICE: 09-25-24 @ 18:37    CHIEF COMPLAINT:Patient is a 38y old  Male who presents with a chief complaint of chest pain (25 Sep 2024 17:50)      HISTORY OF PRESENT ILLNESS:  38-year-old male PMH of daily marijuana use otherwise no significant past medical history, social EtOH use on the weekends, no cigarette smoking or cocaine/stimulant use, +family history of CAD less than 65 years old presents to the ED complaining of sudden onset chest pain left lower sternal border since 1 PM yesterday afternoon sudden onset no radiation to the neck of the back.  Patient denies neurologic phenomena.  Patient reports nausea without vomiting, reports feeling tactile fever and chills.  Patient denies other recent URI symptoms, cough, hemoptysis lower extremity pain or edema, history of DVT or PE personal or family, recent prolonged immobilization or surgery, hormone use.  Patient says he was at work yesterday morning and started to develop chest pain. states he works as a . at around 12pm started to develop this pain, located to left lower sternal border. was sudden in onset. felt very pale when episode started.  has been constant since then, At that time was 7/10, now decreased to 2/10 with  SOB.    In ED, trops negative and EKG showed some T wave inversions, patient admitted for further w/u (25 Sep 2024 11:10)      PAST MEDICAL & SURGICAL HISTORY:  IBS (irritable bowel syndrome)      H/O colonoscopy      H/O inguinal hernia repair  b/l              MEDICATIONS:        acetaminophen     Tablet .. 650 milliGRAM(s) Oral every 6 hours PRN  melatonin 3 milliGRAM(s) Oral at bedtime PRN    aluminum hydroxide/magnesium hydroxide/simethicone Suspension 30 milliLiter(s) Oral every 4 hours PRN          FAMILY HISTORY:  Family history of acute myocardial infarction (Father)        Non-contributory    SOCIAL HISTORY:    [- ] Tobacco  [+ ] Drugs--> daily marijuana   [+ ] Alcohol--> social    Allergies    No Known Allergies    Intolerances    	    REVIEW OF SYSTEMS:  CONSTITUTIONAL: No fever  EYES: No eye pain, visual disturbances, or discharge  ENMT:  No difficulty hearing, tinnitus  NECK: No pain or stiffness  RESPIRATORY: No cough, wheezing,  CARDIOVASCULAR: No chest pain, palpitations, passing out, dizziness, or leg swelling  GASTROINTESTINAL:  No nausea, vomiting, diarrhea or constipation. No melena.  GENITOURINARY: No dysuria, hematuria  NEUROLOGICAL: No stroke like symptoms  SKIN: No burning or lesions   ENDOCRINE: No heat or cold intolerance  MUSCULOSKELETAL: No joint pain or swelling  PSYCHIATRIC: No  anxiety, mood swings  HEME/LYMPH: No bleeding gums  ALLERGY AND IMMUNOLOGIC: No hives or eczema	    All other ROS negative    PHYSICAL EXAM:  T(C): 37.2 (09-25-24 @ 17:39), Max: 37.2 (09-25-24 @ 17:39)  HR: 66 (09-25-24 @ 17:39) (53 - 84)  BP: 113/66 (09-25-24 @ 17:39) (113/66 - 133/91)  RR: 18 (09-25-24 @ 17:39) (16 - 18)  SpO2: 98% (09-25-24 @ 17:39) (97% - 99%)  Wt(kg): --  I&O's Summary      Appearance: Normal	  HEENT:   Normal oral mucosa, EOMI	  Cardiovascular:  S1 S2, No JVD,    Respiratory: Lungs clear to auscultation	  Psychiatry: Alert  Gastrointestinal:  Soft, Non-tender, + BS	  Skin: No rashes   Neurologic: Non-focal  Extremities:  No edema  Vascular: Peripheral pulses palpable    	    	  	  CARDIAC MARKERS:  Labs personally reviewed by me                                  14.6   10.49 )-----------( 204      ( 24 Sep 2024 21:04 )             42.6     09-24    137  |  102  |  15  ----------------------------<  105[H]  4.0   |  20[L]  |  0.74    Ca    9.5      24 Sep 2024 21:04    TPro  7.6  /  Alb  4.8  /  TBili  0.9  /  DBili  x   /  AST  96[H]  /  ALT  95[H]  /  AlkPhos  81  09-24          EKG: Personally reviewed by me -   Radiology: Personally reviewed by me -   < from: Xray Chest 2 Views PA/Lat (09.24.24 @ 21:12) >    IMPRESSION:    Clear Lungs.    < end of copied text >  < from: TTE Limited W or WO Ultrasound Enhancing Agent (09.25.24 @ 14:58) >   1. Left ventricular cavity is normal in size. Left ventricular wall thickness is normal. Left ventricular systolic function is normal with an ejection fraction of 54 % by 3D. There are no regional wall motion abnormalities seen.   2. Normal left ventricular diastolic function, with normal left ventricular filling pressure.   3. Normal right ventricular cavity size, with normal wall thickness, and normal right ventricular systolic function. Tricuspid annular plane systolic excursion (TAPSE) is 2.1 cm (normal >=1.7 cm).   4. No pericardial effusion seen.   5. Left ventricular global longitudinal strain is -21.0 % which is normal (< -18%). Images were acquired on a Miller ultrasound system and processed on the ultrasound machine with a heart rate of 54 bpm and a blood pressure of 120/80 mmHg.   6. No prior echocardiogram is available for comparison.    < end of copied text >  < from: CT Angio Heart and Coronaries w/ IV Cont (09.25.24 @ 15:20) >  Calcium Score: the observed Agatston calcium score is  0  Left main (LM) coronary artery:  0  Left anterior descending (LAD) coronary artery:  0  Left circumflex (LCX) coronary artery:  0  Right coronary artery (RCA):  0        Assessment /Plan:     38-year-old male PMH of daily marijuana use otherwise no significant past medical history, social EtOH use on the weekends, no cigarette smoking or cocaine/stimulant use, +family history of CAD less than 65 years old presents to the ED complaining of sudden onset chest pain left lower sternal border since 1 PM yesterday afternoon admitted for further w/u.    Problem/Plan #1:  Problem: Chest Pain  - Trop neg x 2  - TTE wnl  - CT Heart with normal cors  - No further inpatient cardiac workup.   - OP follow up for event monitor      Differential diagnosis and plan of care discussed with patient after the evaluation. Counseling on diet, nutritional counseling, weight management, exercise and medication compliance was done.   Advanced care planning/advanced directives discussed with patient/family. DNR status including forceful chest compressions to attempt to restart the heart, ventilator support/artificial breathing, electric shock, artificial nutrition, health care proxy, Molst form all discussed with pt. Pt wishes to consider. More than fifteen minutes spent on discussing advanced directives.       Jarrett Wong DO [facility]  Cardiovascular Medicine  [address] [place] [address] Dr, [address]  Available for call or text via [employer]  Office [phone]   DATE OF SERVICE: 09-25-24 @ 18:37    CHIEF COMPLAINT:Patient is a 38y old  Male who presents with a chief complaint of chest pain (25 Sep 2024 17:50)      HISTORY OF PRESENT ILLNESS:  38-year-old male PMH of daily marijuana use otherwise no significant past medical history, social EtOH use on the weekends, no cigarette smoking or cocaine/stimulant use, +family history of CAD less than 65 years old presents to the ED complaining of sudden onset chest pain left lower sternal border since 1 PM yesterday afternoon sudden onset no radiation to the neck of the back.  Patient denies neurologic phenomena.  Patient reports nausea without vomiting, reports feeling tactile fever and chills.  Patient denies other recent URI symptoms, cough, hemoptysis lower extremity pain or edema, history of DVT or PE personal or family, recent prolonged immobilization or surgery, hormone use.  Patient says he was at work yesterday morning and started to develop chest pain. states he works as a . at around 12pm started to develop this pain, located to left lower sternal border. was sudden in onset. felt very pale when episode started.  has been constant since then, At that time was 7/10, now decreased to 2/10 with  SOB.    In ED, trops negative and EKG showed some T wave inversions, patient admitted for further w/u (25 Sep 2024 11:10)      PAST MEDICAL & SURGICAL HISTORY:  IBS (irritable bowel syndrome)      H/O colonoscopy      H/O inguinal hernia repair  b/l              MEDICATIONS:        acetaminophen     Tablet .. 650 milliGRAM(s) Oral every 6 hours PRN  melatonin 3 milliGRAM(s) Oral at bedtime PRN    aluminum hydroxide/magnesium hydroxide/simethicone Suspension 30 milliLiter(s) Oral every 4 hours PRN          FAMILY HISTORY:  Family history of acute myocardial infarction (Father)        Non-contributory    SOCIAL HISTORY:    [- ] Tobacco  [+ ] Drugs--> daily marijuana   [+ ] Alcohol--> social    Allergies    No Known Allergies    Intolerances    	    REVIEW OF SYSTEMS:  CONSTITUTIONAL: No fever  EYES: No eye pain, visual disturbances, or discharge  ENMT:  No difficulty hearing, tinnitus  NECK: No pain or stiffness  RESPIRATORY: No cough, wheezing,  CARDIOVASCULAR: No chest pain, palpitations, passing out, dizziness, or leg swelling  GASTROINTESTINAL:  No nausea, vomiting, diarrhea or constipation. No melena.  GENITOURINARY: No dysuria, hematuria  NEUROLOGICAL: No stroke like symptoms  SKIN: No burning or lesions   ENDOCRINE: No heat or cold intolerance  MUSCULOSKELETAL: No joint pain or swelling  PSYCHIATRIC: No  anxiety, mood swings  HEME/LYMPH: No bleeding gums  ALLERGY AND IMMUNOLOGIC: No hives or eczema	    All other ROS negative    PHYSICAL EXAM:  T(C): 37.2 (09-25-24 @ 17:39), Max: 37.2 (09-25-24 @ 17:39)  HR: 66 (09-25-24 @ 17:39) (53 - 84)  BP: 113/66 (09-25-24 @ 17:39) (113/66 - 133/91)  RR: 18 (09-25-24 @ 17:39) (16 - 18)  SpO2: 98% (09-25-24 @ 17:39) (97% - 99%)  Wt(kg): --  I&O's Summary      Appearance: Normal	  HEENT:   Normal oral mucosa, EOMI	  Cardiovascular:  S1 S2, No JVD,    Respiratory: Lungs clear to auscultation	  Psychiatry: Alert  Gastrointestinal:  Soft, Non-tender, + BS	  Skin: No rashes   Neurologic: Non-focal  Extremities:  No edema  Vascular: Peripheral pulses palpable    	    	  	  CARDIAC MARKERS:  Labs personally reviewed by me                                  14.6   10.49 )-----------( 204      ( 24 Sep 2024 21:04 )             42.6     09-24    137  |  102  |  15  ----------------------------<  105[H]  4.0   |  20[L]  |  0.74    Ca    9.5      24 Sep 2024 21:04    TPro  7.6  /  Alb  4.8  /  TBili  0.9  /  DBili  x   /  AST  96[H]  /  ALT  95[H]  /  AlkPhos  81  09-24          EKG: Personally reviewed by me -   Radiology: Personally reviewed by me -   < from: Xray Chest 2 Views PA/Lat (09.24.24 @ 21:12) >    IMPRESSION:    Clear Lungs.    < end of copied text >  < from: TTE Limited W or WO Ultrasound Enhancing Agent (09.25.24 @ 14:58) >   1. Left ventricular cavity is normal in size. Left ventricular wall thickness is normal. Left ventricular systolic function is normal with an ejection fraction of 54 % by 3D. There are no regional wall motion abnormalities seen.   2. Normal left ventricular diastolic function, with normal left ventricular filling pressure.   3. Normal right ventricular cavity size, with normal wall thickness, and normal right ventricular systolic function. Tricuspid annular plane systolic excursion (TAPSE) is 2.1 cm (normal >=1.7 cm).   4. No pericardial effusion seen.   5. Left ventricular global longitudinal strain is -21.0 % which is normal (< -18%). Images were acquired on a Miller ultrasound system and processed on the ultrasound machine with a heart rate of 54 bpm and a blood pressure of 120/80 mmHg.   6. No prior echocardiogram is available for comparison.    < end of copied text >  < from: CT Angio Heart and Coronaries w/ IV Cont (09.25.24 @ 15:20) >  Calcium Score: the observed Agatston calcium score is  0  Left main (LM) coronary artery:  0  Left anterior descending (LAD) coronary artery:  0  Left circumflex (LCX) coronary artery:  0  Right coronary artery (RCA):  0        Assessment /Plan:     38-year-old male PMH of daily marijuana use otherwise no significant past medical history, social EtOH use on the weekends, no cigarette smoking or cocaine/stimulant use, +family history of CAD less than 65 years old presents to the ED complaining of sudden onset chest pain left lower sternal border since 1 PM yesterday afternoon admitted for further w/u.    Problem/Plan #1:  Problem: Chest Pain  - Trop neg x 2  - TTE unremarkable  - Given family hx of premature CAD we pursued ischemic eval  - R/b of CTA heart and cors discussed with pt, agreeable to proceed, s/p CTA heart with normal cors  - Chest pain is likely noncardiac in origin  - OP follow up for prolonged monitor given episodes of lightheadedness           Differential diagnosis and plan of care discussed with patient after the evaluation. Counseling on diet, nutritional counseling, weight management, exercise and medication compliance was done.   Advanced care planning/advanced directives discussed with patient/family. DNR status including forceful chest compressions to attempt to restart the heart, ventilator support/artificial breathing, electric shock, artificial nutrition, health care proxy, Molst form all discussed with pt. Pt wishes to consider. More than fifteen minutes spent on discussing advanced directives. Seventy five minutes spent on encounter, of which more than fifty percent of the encounter was spent on counseling and/or coordinating care by the attending physician.      Jarrett Wong DO [facility]  Cardiovascular Medicine  [address] [place] [address], [address]  Available for call or text via [employer]  Office [phone]

## 2024-09-25 NOTE — DISCHARGE NOTE PROVIDER - NSDCCPCAREPLAN_GEN_ALL_CORE_FT
PRINCIPAL DISCHARGE DIAGNOSIS  Diagnosis: Chest pain  Assessment and Plan of Treatment: No source of for your chest pain was found.  Follow up with your Primary Care Provider  Seek urgent medical care if you have chest pain, swelling, shortness of breath, palpitations, dizziness, weakness, or  fever.

## 2024-09-25 NOTE — DISCHARGE NOTE PROVIDER - NSDCMRMEDTOKEN_GEN_ALL_CORE_FT
aluminum hydroxide-magnesium hydroxide 200 mg-200 mg/5 mL oral suspension: 30 milliliter(s) orally every 4 hours as needed for Dyspepsia

## 2024-09-25 NOTE — DISCHARGE NOTE PROVIDER - ATTENDING ATTESTATION STATEMENT
I have personally seen and examined the patient. I have collaborated with and supervised the
[facility]

## 2024-09-25 NOTE — H&P ADULT - NSHPLABSRESULTS_GEN_ALL_CORE
LABS:                         14.6   10.49 )-----------( 204      ( 24 Sep 2024 21:04 )             42.6     09-24    137  |  102  |  15  ----------------------------<  105[H]  4.0   |  20[L]  |  0.74    Ca    9.5      24 Sep 2024 21:04    TPro  7.6  /  Alb  4.8  /  TBili  0.9  /  DBili  x   /  AST  96[H]  /  ALT  95[H]  /  AlkPhos  81  09-24      Urinalysis Basic - ( 24 Sep 2024 21:04 )    Color: x / Appearance: x / SG: x / pH: x  Gluc: 105 mg/dL / Ketone: x  / Bili: x / Urobili: x   Blood: x / Protein: x / Nitrite: x   Leuk Esterase: x / RBC: x / WBC x   Sq Epi: x / Non Sq Epi: x / Bacteria: x              Records reviewed from prior hospitalization.  Labs reviewed   EKG reviewed

## 2024-09-25 NOTE — H&P ADULT - NSHPPHYSICALEXAM_GEN_ALL_CORE
Vital Signs Last 24 Hrs  T(C): 36.8 (25 Sep 2024 08:15), Max: 36.8 (24 Sep 2024 16:54)  T(F): 98.2 (25 Sep 2024 08:15), Max: 98.2 (24 Sep 2024 16:54)  HR: 57 (25 Sep 2024 08:15) (53 - 84)  BP: 116/76 (25 Sep 2024 08:15) (85/71 - 133/91)  BP(mean): 105 (24 Sep 2024 21:30) (105 - 105)  RR: 18 (25 Sep 2024 08:15) (15 - 18)  SpO2: 99% (25 Sep 2024 08:15) (97% - 99%)    Parameters below as of 25 Sep 2024 08:15  Patient On (Oxygen Delivery Method): room air        CONSTITUTIONAL: Well-groomed, in no apparent distress  EYES: No conjunctival or scleral injection, non-icteric; PERRLA and symmetric  ENMT: No external nasal lesions; nasal mucosa not inflamed; normal dentition; no pharyngeal injection or exudates, oral mucosa with moist membranes  RESPIRATORY: Breathing comfortably; lungs CTA without wheeze/rhonchi/rales  CARDIOVASCULAR: +S1S2, RRR, no M/G/R; no carotid bruits; pedal pulses full and symmetric; no lower extremity edema  GASTROINTESTINAL: No palpable masses or tenderness, +BS throughout, no rebound/guarding; no hepatosplenomegaly; no hernia palpated  LYMPHATIC: No cervical LAD or tenderness; no axillary LAD or tenderness; no inguinal LAD or tenderness  MUSCULOSKELETAL: Normal gait and station; no digital clubbing or cyanosis; no paraspinal tenderness; no malalignment of extremities  SKIN: No rashes or ulcers noted; no subcutaneous nodules or induration palpable  NEUROLOGIC: CN grossly intact; sensation intact in LEs b/l to light touch; normal strength and tone  PSYCHIATRIC: A+O x 3; mood and affect appropriate; appropriate insight and judgment

## 2024-10-02 ENCOUNTER — APPOINTMENT (OUTPATIENT)
Dept: INTERNAL MEDICINE | Facility: CLINIC | Age: 39
End: 2024-10-02
Payer: COMMERCIAL

## 2024-10-02 ENCOUNTER — OUTPATIENT (OUTPATIENT)
Dept: OUTPATIENT SERVICES | Facility: HOSPITAL | Age: 39
LOS: 1 days | End: 2024-10-02
Payer: COMMERCIAL

## 2024-10-02 VITALS
OXYGEN SATURATION: 98 % | HEART RATE: 66 BPM | SYSTOLIC BLOOD PRESSURE: 110 MMHG | BODY MASS INDEX: 32.43 KG/M2 | HEIGHT: 68 IN | DIASTOLIC BLOOD PRESSURE: 70 MMHG | WEIGHT: 214 LBS

## 2024-10-02 DIAGNOSIS — I10 ESSENTIAL (PRIMARY) HYPERTENSION: ICD-10-CM

## 2024-10-02 DIAGNOSIS — S29.012A STRAIN OF MUSCLE AND TENDON OF BACK WALL OF THORAX, INITIAL ENCOUNTER: ICD-10-CM

## 2024-10-02 DIAGNOSIS — K21.9 GASTRO-ESOPHAGEAL REFLUX DISEASE W/OUT ESOPHAGITIS: ICD-10-CM

## 2024-10-02 DIAGNOSIS — R07.9 CHEST PAIN, UNSPECIFIED: ICD-10-CM

## 2024-10-02 DIAGNOSIS — Z98.89 OTHER SPECIFIED POSTPROCEDURAL STATES: Chronic | ICD-10-CM

## 2024-10-02 DIAGNOSIS — Z98.890 OTHER SPECIFIED POSTPROCEDURAL STATES: Chronic | ICD-10-CM

## 2024-10-02 PROCEDURE — 99214 OFFICE O/P EST MOD 30 MIN: CPT

## 2024-10-02 PROCEDURE — G0463: CPT

## 2024-10-02 PROCEDURE — G2211 COMPLEX E/M VISIT ADD ON: CPT

## 2024-10-06 PROBLEM — R07.9 CHEST PAIN: Status: ACTIVE | Noted: 2024-10-06

## 2024-10-06 NOTE — REVIEW OF SYSTEMS
[Negative] : Respiratory [FreeTextEntry5] : see hpi  [FreeTextEntry7] : see hpi  [FreeTextEntry9] : see hpi

## 2024-10-06 NOTE — PHYSICAL EXAM
[No Acute Distress] : no acute distress [Well Nourished] : well nourished [Well Developed] : well developed [Well-Appearing] : well-appearing [No Respiratory Distress] : no respiratory distress  [No Accessory Muscle Use] : no accessory muscle use [Clear to Auscultation] : lungs were clear to auscultation bilaterally [Normal Rate] : normal rate  [Regular Rhythm] : with a regular rhythm [Normal S1, S2] : normal S1 and S2 [No Murmur] : no murmur heard [No Carotid Bruits] : no carotid bruits [No Edema] : there was no peripheral edema [No Extremity Clubbing/Cyanosis] : no extremity clubbing/cyanosis [Soft] : abdomen soft [Non Tender] : non-tender [Non-distended] : non-distended [No HSM] : no HSM

## 2024-10-06 NOTE — HISTORY OF PRESENT ILLNESS
[FreeTextEntry8] : Reviewing an ER visit her had for feeling of chest pain.  Explained he had an empanada a few hours before, not sure if it was off/spicier than usual, but developed a pain L mid chest, felt it to back to some extent to; had no other radiation, no palpitations associated.  Felt gas/air, some burn to the feeling; also felt like he could accentuate the pain he was having by pushing at the chest where he was feeling it.  In ER, had ECG/troponin, ok x ECG had repolarization changes and enough uncertainty to proceed with card eval there - ultimately had CT angio coronaries to check lungs and coronary flow - this was normal study, he was then discharged on PPI.  Here a few days after that.  Has had no recurrence of syx.   Also has been having upper back ache after work last few weeks, would like chiropractor to check it out.  Has no radicular syx into arms with that

## 2024-10-06 NOTE — ASSESSMENT
[FreeTextEntry1] : 1) favor combination of esophageal spasm that day, perhaps with some musculoskeletal strain he noted with reproducibility of some of the pain.  Had extensive look at coronary arteries and overall card structurally with imaging after equivocal ECG in ER.  Explained we can be reassured by that.  He will proceed with omeprazole he was given x 4-6 weeks to confirm non recurrence of syx/episode on it.  2) given chiropractic order/ref with his insurance for his upper back muscular strain.    34 minutes were spent in preparation of this visit, including review of previous notes and test results, interview and examination of the patient, communication with other care contributors, discussion of the plan, arranging for appropriate testing and treatment, and documentation.

## 2024-10-14 DIAGNOSIS — R07.9 CHEST PAIN, UNSPECIFIED: ICD-10-CM

## 2024-10-14 DIAGNOSIS — K21.9 GASTRO-ESOPHAGEAL REFLUX DISEASE WITHOUT ESOPHAGITIS: ICD-10-CM

## 2024-10-14 DIAGNOSIS — S29.012A STRAIN OF MUSCLE AND TENDON OF BACK WALL OF THORAX, INITIAL ENCOUNTER: ICD-10-CM

## 2025-01-08 ENCOUNTER — APPOINTMENT (OUTPATIENT)
Dept: INTERNAL MEDICINE | Facility: CLINIC | Age: 40
End: 2025-01-08
Payer: COMMERCIAL

## 2025-01-08 ENCOUNTER — OUTPATIENT (OUTPATIENT)
Dept: OUTPATIENT SERVICES | Facility: HOSPITAL | Age: 40
LOS: 1 days | End: 2025-01-08
Payer: COMMERCIAL

## 2025-01-08 VITALS
OXYGEN SATURATION: 97 % | SYSTOLIC BLOOD PRESSURE: 116 MMHG | DIASTOLIC BLOOD PRESSURE: 68 MMHG | WEIGHT: 217 LBS | BODY MASS INDEX: 32.99 KG/M2 | HEART RATE: 73 BPM

## 2025-01-08 DIAGNOSIS — I10 ESSENTIAL (PRIMARY) HYPERTENSION: ICD-10-CM

## 2025-01-08 DIAGNOSIS — R30.0 DYSURIA: ICD-10-CM

## 2025-01-08 DIAGNOSIS — K50.90 CROHN'S DISEASE, UNSPECIFIED, W/OUT COMPLICATIONS: ICD-10-CM

## 2025-01-08 DIAGNOSIS — K57.32 DIVERTICULITIS OF LARGE INTESTINE W/OUT PERFORATION OR ABSCESS W/OUT BLEEDING: ICD-10-CM

## 2025-01-08 DIAGNOSIS — R10.32 LEFT LOWER QUADRANT PAIN: ICD-10-CM

## 2025-01-08 DIAGNOSIS — Q45.3 OTHER CONGENITAL MALFORMATIONS OF PANCREAS AND PANCREATIC DUCT: ICD-10-CM

## 2025-01-08 DIAGNOSIS — Z98.89 OTHER SPECIFIED POSTPROCEDURAL STATES: Chronic | ICD-10-CM

## 2025-01-08 DIAGNOSIS — K21.9 GASTRO-ESOPHAGEAL REFLUX DISEASE W/OUT ESOPHAGITIS: ICD-10-CM

## 2025-01-08 PROCEDURE — G0463: CPT

## 2025-01-08 PROCEDURE — 81003 URINALYSIS AUTO W/O SCOPE: CPT

## 2025-01-08 PROCEDURE — G2211 COMPLEX E/M VISIT ADD ON: CPT

## 2025-01-08 PROCEDURE — 99214 OFFICE O/P EST MOD 30 MIN: CPT

## 2025-01-09 ENCOUNTER — TRANSCRIPTION ENCOUNTER (OUTPATIENT)
Age: 40
End: 2025-01-09

## 2025-01-09 ENCOUNTER — OUTPATIENT (OUTPATIENT)
Dept: OUTPATIENT SERVICES | Facility: HOSPITAL | Age: 40
LOS: 1 days | End: 2025-01-09
Payer: COMMERCIAL

## 2025-01-09 ENCOUNTER — APPOINTMENT (OUTPATIENT)
Dept: CT IMAGING | Facility: IMAGING CENTER | Age: 40
End: 2025-01-09
Payer: COMMERCIAL

## 2025-01-09 DIAGNOSIS — Z98.890 OTHER SPECIFIED POSTPROCEDURAL STATES: Chronic | ICD-10-CM

## 2025-01-09 DIAGNOSIS — K50.90 CROHN'S DISEASE, UNSPECIFIED, WITHOUT COMPLICATIONS: ICD-10-CM

## 2025-01-09 DIAGNOSIS — Z00.8 ENCOUNTER FOR OTHER GENERAL EXAMINATION: ICD-10-CM

## 2025-01-09 DIAGNOSIS — Z98.89 OTHER SPECIFIED POSTPROCEDURAL STATES: Chronic | ICD-10-CM

## 2025-01-09 DIAGNOSIS — K57.32 DIVERTICULITIS OF LARGE INTESTINE WITHOUT PERFORATION OR ABSCESS WITHOUT BLEEDING: ICD-10-CM

## 2025-01-09 DIAGNOSIS — R30.0 DYSURIA: ICD-10-CM

## 2025-01-09 LAB
ALBUMIN SERPL ELPH-MCNC: 5 G/DL
ALP BLD-CCNC: 68 U/L
ALT SERPL-CCNC: 31 U/L
AMYLASE/CREAT SERPL: 62 U/L
ANION GAP SERPL CALC-SCNC: 11 MMOL/L
APPEARANCE: CLEAR
AST SERPL-CCNC: 25 U/L
BACTERIA UR CULT: NORMAL
BACTERIA: NEGATIVE /HPF
BASOPHILS # BLD AUTO: 0.05 K/UL
BASOPHILS NFR BLD AUTO: 0.6 %
BILIRUB SERPL-MCNC: 0.6 MG/DL
BILIRUB UR QL STRIP: NORMAL
BILIRUBIN URINE: NEGATIVE
BLOOD URINE: ABNORMAL
BUN SERPL-MCNC: 15 MG/DL
CALCIUM SERPL-MCNC: 10.3 MG/DL
CAST: 0 /LPF
CHLORIDE SERPL-SCNC: 103 MMOL/L
CO2 SERPL-SCNC: 23 MMOL/L
COLLECTION METHOD: NORMAL
COLOR: YELLOW
CREAT SERPL-MCNC: 0.75 MG/DL
EGFR: 117 ML/MIN/1.73M2
EOSINOPHIL # BLD AUTO: 0.18 K/UL
EOSINOPHIL NFR BLD AUTO: 2.3 %
EPITHELIAL CELLS: 0 /HPF
GLUCOSE QUALITATIVE U: NEGATIVE MG/DL
GLUCOSE SERPL-MCNC: 96 MG/DL
GLUCOSE UR-MCNC: NORMAL
HCG UR QL: 0.2 EU/DL
HCT VFR BLD CALC: 45.8 %
HGB BLD-MCNC: 15.4 G/DL
HGB UR QL STRIP.AUTO: NORMAL
IMM GRANULOCYTES NFR BLD AUTO: 0.4 %
KETONES UR-MCNC: NORMAL
KETONES URINE: NEGATIVE MG/DL
LEUKOCYTE ESTERASE UR QL STRIP: NORMAL
LEUKOCYTE ESTERASE URINE: NEGATIVE
LPL SERPL-CCNC: 26 U/L
LYMPHOCYTES # BLD AUTO: 2.05 K/UL
LYMPHOCYTES NFR BLD AUTO: 25.8 %
MAN DIFF?: NORMAL
MCHC RBC-ENTMCNC: 29.4 PG
MCHC RBC-ENTMCNC: 33.6 G/DL
MCV RBC AUTO: 87.4 FL
MICROSCOPIC-UA: NORMAL
MONOCYTES # BLD AUTO: 0.66 K/UL
MONOCYTES NFR BLD AUTO: 8.3 %
NEUTROPHILS # BLD AUTO: 4.98 K/UL
NEUTROPHILS NFR BLD AUTO: 62.6 %
NITRITE UR QL STRIP: NORMAL
NITRITE URINE: NEGATIVE
PH UR STRIP: 5.5
PH URINE: 5.5
PLATELET # BLD AUTO: 255 K/UL
POTASSIUM SERPL-SCNC: 4.6 MMOL/L
PROT SERPL-MCNC: 7.4 G/DL
PROT UR STRIP-MCNC: NORMAL
PROTEIN URINE: NEGATIVE MG/DL
RBC # BLD: 5.24 M/UL
RBC # FLD: 12.8 %
RED BLOOD CELLS URINE: 1 /HPF
REVIEW: NORMAL
SODIUM SERPL-SCNC: 137 MMOL/L
SP GR UR STRIP: >=1.03
SPECIFIC GRAVITY URINE: 1.03
UROBILINOGEN URINE: 0.2 MG/DL
WBC # FLD AUTO: 7.95 K/UL
WHITE BLOOD CELLS URINE: 0 /HPF

## 2025-01-09 PROCEDURE — 74178 CT ABD&PLV WO CNTR FLWD CNTR: CPT | Mod: 26

## 2025-01-09 PROCEDURE — 74178 CT ABD&PLV WO CNTR FLWD CNTR: CPT

## 2025-01-11 ENCOUNTER — TRANSCRIPTION ENCOUNTER (OUTPATIENT)
Age: 40
End: 2025-01-11

## 2025-01-22 DIAGNOSIS — R10.32 LEFT LOWER QUADRANT PAIN: ICD-10-CM

## 2025-01-22 DIAGNOSIS — Q45.3 OTHER CONGENITAL MALFORMATIONS OF PANCREAS AND PANCREATIC DUCT: ICD-10-CM

## 2025-01-22 DIAGNOSIS — R30.0 DYSURIA: ICD-10-CM

## 2025-01-22 DIAGNOSIS — K21.9 GASTRO-ESOPHAGEAL REFLUX DISEASE WITHOUT ESOPHAGITIS: ICD-10-CM

## 2025-01-22 DIAGNOSIS — K57.32 DIVERTICULITIS OF LARGE INTESTINE WITHOUT PERFORATION OR ABSCESS WITHOUT BLEEDING: ICD-10-CM

## 2025-01-22 DIAGNOSIS — K50.90 CROHN'S DISEASE, UNSPECIFIED, WITHOUT COMPLICATIONS: ICD-10-CM

## 2025-02-11 ENCOUNTER — NON-APPOINTMENT (OUTPATIENT)
Age: 40
End: 2025-02-11

## 2025-02-19 ENCOUNTER — NON-APPOINTMENT (OUTPATIENT)
Age: 40
End: 2025-02-19

## 2025-02-21 ENCOUNTER — APPOINTMENT (OUTPATIENT)
Dept: ORTHOPEDIC SURGERY | Facility: CLINIC | Age: 40
End: 2025-02-21
Payer: COMMERCIAL

## 2025-02-21 DIAGNOSIS — M76.61 ACHILLES TENDINITIS, RIGHT LEG: ICD-10-CM

## 2025-02-21 PROCEDURE — 99213 OFFICE O/P EST LOW 20 MIN: CPT

## 2025-02-26 ENCOUNTER — APPOINTMENT (OUTPATIENT)
Dept: INTERNAL MEDICINE | Facility: CLINIC | Age: 40
End: 2025-02-26
Payer: COMMERCIAL

## 2025-02-26 ENCOUNTER — OUTPATIENT (OUTPATIENT)
Dept: OUTPATIENT SERVICES | Facility: HOSPITAL | Age: 40
LOS: 1 days | End: 2025-02-26
Payer: COMMERCIAL

## 2025-02-26 VITALS
DIASTOLIC BLOOD PRESSURE: 72 MMHG | HEART RATE: 80 BPM | OXYGEN SATURATION: 96 % | WEIGHT: 210 LBS | BODY MASS INDEX: 31.93 KG/M2 | SYSTOLIC BLOOD PRESSURE: 120 MMHG

## 2025-02-26 DIAGNOSIS — Z87.19 PERSONAL HISTORY OF OTHER DISEASES OF THE DIGESTIVE SYSTEM: ICD-10-CM

## 2025-02-26 DIAGNOSIS — Z98.890 OTHER SPECIFIED POSTPROCEDURAL STATES: Chronic | ICD-10-CM

## 2025-02-26 DIAGNOSIS — R10.32 LEFT LOWER QUADRANT PAIN: ICD-10-CM

## 2025-02-26 DIAGNOSIS — Z98.89 OTHER SPECIFIED POSTPROCEDURAL STATES: Chronic | ICD-10-CM

## 2025-02-26 DIAGNOSIS — K58.9 IRRITABLE BOWEL SYNDROME, UNSPECIFIED: ICD-10-CM

## 2025-02-26 DIAGNOSIS — I10 ESSENTIAL (PRIMARY) HYPERTENSION: ICD-10-CM

## 2025-02-26 PROCEDURE — G0463: CPT

## 2025-02-26 PROCEDURE — G2211 COMPLEX E/M VISIT ADD ON: CPT | Mod: NC

## 2025-02-26 PROCEDURE — 99213 OFFICE O/P EST LOW 20 MIN: CPT

## 2025-03-01 PROBLEM — Z87.19 HISTORY OF CROHN'S DISEASE: Status: RESOLVED | Noted: 2025-01-08 | Resolved: 2025-03-01

## 2025-03-01 PROBLEM — K58.9 IRRITABLE BOWEL SYNDROME: Status: ACTIVE | Noted: 2025-03-01

## 2025-03-10 DIAGNOSIS — K58.9 IRRITABLE BOWEL SYNDROME, UNSPECIFIED: ICD-10-CM

## 2025-03-10 DIAGNOSIS — R10.32 LEFT LOWER QUADRANT PAIN: ICD-10-CM

## 2025-03-20 ENCOUNTER — NON-APPOINTMENT (OUTPATIENT)
Age: 40
End: 2025-03-20

## 2025-04-15 ENCOUNTER — APPOINTMENT (OUTPATIENT)
Dept: ORTHOPEDIC SURGERY | Facility: CLINIC | Age: 40
End: 2025-04-15
Payer: COMMERCIAL

## 2025-04-15 VITALS — BODY MASS INDEX: 31.83 KG/M2 | HEIGHT: 68 IN | WEIGHT: 210 LBS

## 2025-04-15 DIAGNOSIS — M76.61 ACHILLES TENDINITIS, RIGHT LEG: ICD-10-CM

## 2025-04-15 PROCEDURE — 99213 OFFICE O/P EST LOW 20 MIN: CPT

## 2025-07-31 ENCOUNTER — NON-APPOINTMENT (OUTPATIENT)
Age: 40
End: 2025-07-31

## 2025-08-14 ENCOUNTER — APPOINTMENT (OUTPATIENT)
Dept: ORTHOPEDIC SURGERY | Facility: CLINIC | Age: 40
End: 2025-08-14
Payer: COMMERCIAL

## 2025-08-14 VITALS — BODY MASS INDEX: 31.07 KG/M2 | HEIGHT: 68 IN | WEIGHT: 205 LBS

## 2025-08-14 DIAGNOSIS — M25.812 OTHER SPECIFIED JOINT DISORDERS, LEFT SHOULDER: ICD-10-CM

## 2025-08-14 DIAGNOSIS — M25.512 PAIN IN LEFT SHOULDER: ICD-10-CM

## 2025-08-14 DIAGNOSIS — M62.838 OTHER MUSCLE SPASM: ICD-10-CM

## 2025-08-14 DIAGNOSIS — S93.492D SPRAIN OF OTHER LIGAMENT OF LEFT ANKLE, SUBSEQUENT ENCOUNTER: ICD-10-CM

## 2025-08-14 PROCEDURE — 20610 DRAIN/INJ JOINT/BURSA W/O US: CPT | Mod: LT

## 2025-08-14 PROCEDURE — 72040 X-RAY EXAM NECK SPINE 2-3 VW: CPT

## 2025-08-14 PROCEDURE — 73030 X-RAY EXAM OF SHOULDER: CPT | Mod: LT

## 2025-08-14 PROCEDURE — 99213 OFFICE O/P EST LOW 20 MIN: CPT | Mod: 25
